# Patient Record
Sex: FEMALE | Race: WHITE | Employment: FULL TIME | URBAN - METROPOLITAN AREA
[De-identification: names, ages, dates, MRNs, and addresses within clinical notes are randomized per-mention and may not be internally consistent; named-entity substitution may affect disease eponyms.]

---

## 2017-12-14 ENCOUNTER — OFFICE VISIT (OUTPATIENT)
Dept: CARDIOLOGY CLINIC | Age: 37
End: 2017-12-14

## 2017-12-14 VITALS
DIASTOLIC BLOOD PRESSURE: 82 MMHG | WEIGHT: 142.8 LBS | SYSTOLIC BLOOD PRESSURE: 122 MMHG | BODY MASS INDEX: 26.28 KG/M2 | HEIGHT: 62 IN | OXYGEN SATURATION: 99 % | HEART RATE: 80 BPM

## 2017-12-14 DIAGNOSIS — R00.2 PALPITATION: ICD-10-CM

## 2017-12-14 DIAGNOSIS — E78.00 HYPERCHOLESTEREMIA: ICD-10-CM

## 2017-12-14 DIAGNOSIS — I47.1 SVT (SUPRAVENTRICULAR TACHYCARDIA) (HCC): ICD-10-CM

## 2017-12-14 DIAGNOSIS — R07.89 OTHER CHEST PAIN: ICD-10-CM

## 2017-12-14 DIAGNOSIS — G90.A POTS (POSTURAL ORTHOSTATIC TACHYCARDIA SYNDROME): Primary | ICD-10-CM

## 2017-12-14 DIAGNOSIS — F41.9 ANXIETY: ICD-10-CM

## 2017-12-14 PROCEDURE — 1036F TOBACCO NON-USER: CPT | Performed by: INTERNAL MEDICINE

## 2017-12-14 PROCEDURE — G8484 FLU IMMUNIZE NO ADMIN: HCPCS | Performed by: INTERNAL MEDICINE

## 2017-12-14 PROCEDURE — 93000 ELECTROCARDIOGRAM COMPLETE: CPT | Performed by: INTERNAL MEDICINE

## 2017-12-14 PROCEDURE — G8419 CALC BMI OUT NRM PARAM NOF/U: HCPCS | Performed by: INTERNAL MEDICINE

## 2017-12-14 PROCEDURE — 99204 OFFICE O/P NEW MOD 45 MIN: CPT | Performed by: INTERNAL MEDICINE

## 2017-12-14 PROCEDURE — G8427 DOCREV CUR MEDS BY ELIG CLIN: HCPCS | Performed by: INTERNAL MEDICINE

## 2017-12-14 RX ORDER — LORAZEPAM 0.5 MG/1
0.5 TABLET ORAL PRN
COMMUNITY

## 2017-12-14 RX ORDER — BIOTIN 10 MG
TABLET ORAL DAILY
COMMUNITY
End: 2020-08-06 | Stop reason: SDUPTHER

## 2017-12-14 RX ORDER — LANOLIN ALCOHOL/MO/W.PET/CERES
CREAM (GRAM) TOPICAL DAILY
COMMUNITY
End: 2020-08-06

## 2017-12-14 RX ORDER — ALBUTEROL SULFATE 90 UG/1
AEROSOL, METERED RESPIRATORY (INHALATION) PRN
COMMUNITY

## 2017-12-14 NOTE — COMMUNICATION BODY
[address]   Electrophysiology   Date: 12/14/2017  I had the privilege of visiting Lashonda Crocker in the office. CC: Maintenance evaluation for POTS/tachycardia. HPI: Lashonda Crocker is a 30 y.o. is here for ongoing follow up of POTS. She is currently on metoprolol which has helped reduce the number of palpitations episodes. She has occasional dizziness when she is under stressful situations. She denies chest pain, light-headedness, ALVES. Prior to this visit, she was seen by [facility] physician for symptoms of \"feeling pounding\" of her heart with a squeezing sensation; this occurred several times a day for 1 week but started after she began drinking chocolate protein shakes. After a week, she stopped drinking them, and her symptoms resolved. She was not feeling particularly stressed at the time. She usually tries to avoid caffeine in general. She did not take the salt tablets as recommended at earlier visit, but she is trying to add more salt to foods. She stays active, does not smoke. Interval History:   Past note. \"7/5/17 Eli Marc is a 31 y.o. female, who presents today for a maintenance evaluation for POTS/tachycardia. Today she reports doing well at this time. She denies feeling any difference with increased salt and water consumption. She is still able to feel her heart beating in her chest and head, and occasionally in her fingertips. She denies regularly taking calcium supplements. Her vitamin D was last check in the fall. Also notes her whole face feels hot and her head is occasionally warm. Past Note: \"5/10/2017 Eli Marc is a 31 y.o. female, who presents today for an initial evaluation for POTS/tachycardia. She has a PMHx significant for palpitations and tachyarrhythmias. Today she reports that growing up as a child, she noticed a fast HR, could feel it beating in back of her head. Never told her parents.  Once she was older, would notice her HR would be in the 90's upon having breast.    She denies ever being diagnosed with HTN. Her cholesterol has been elevated, with her total cholesterol up to 235. She has a family history of CAD in her grandparents. Histories:   She has no past medical history on file. no Hx    She has no past surgical history on file. No Hx    Her family history includes Heart disease in her maternal grandfather and paternal grandmother; Hyperlipidemia in her father and mother. No Hx    She reports that she has never smoked. She does not have any smokeless tobacco history on file. She reports that she drinks alcohol. Past Medical History:   Diagnosis Date    Anxiety     Asthma     Hyperlipidemia     Irritable bowel syndrome       Past Surgical History:   Procedure Laterality Date    COLONOSCOPY      UPPER GASTROINTESTINAL ENDOSCOPY       Allergies:  No Known Allergies    Social History:  Reviewed. reports that she has never smoked. She has never used smokeless tobacco. She reports that she drinks alcohol. She reports that she does not use drugs. Never smoked    Family History:  Reviewed. family history includes Anemia in her paternal grandfather and paternal uncle; Arrhythmia in her mother; Colon Cancer in her paternal grandmother; Diabetes in her maternal cousin, maternal grandfather, maternal grandmother, and paternal aunt; Heart Attack in her paternal grandmother; Heart Disease in her maternal grandfather and paternal grandmother; High Blood Pressure in her father, maternal grandfather, mother, and paternal grandmother; High Cholesterol in her father, maternal grandmother, mother, and paternal grandmother; Mental Retardation in her paternal grandmother; Vanice Sleeper / [place] in her mother; Other in her paternal grandmother; Stroke in her maternal grandfather. Denies family history of sudden cardiac death, arrhythmia, premature CAD    Review of System:  All other systems reviewed and are negative except for that noted above.  Pertinent negatives are:     · General: negative for fever, chills   · Ophthalmic ROS: negative for - eye pain or loss of vision  · ENT ROS: negative for - headaches, sore throat   · Respiratory: negative for - cough, sputum  · Cardiovascular: Reviewed in HPI  · Gastrointestinal: negative for - abdominal pain, diarrhea, N/V  · Hematology: negative for - bleeding, blood clots, bruising or jaundice  · Genito-Urinary:  negative for - Dysuria or incontinence  · Musculoskeletal: negative for - Joint swelling, muscle pain  · Neurological: negative for - confusion, dizziness, headaches   · Psychiatric: No anxiety, no depression. · Dermatological: negative for - rash    Physical Examination:  Vitals:    12/14/17 1022   BP: 122/82   Pulse: 80   SpO2: 99%      · Constitutional: Oriented. No distress. · Head: Normocephalic and atraumatic. · Mouth/Throat: Oropharynx is clear and moist.   · Eyes: Conjunctivae normal. EOM are normal.   · Neck: Neck supple. No rigidity. No JVD present. · Cardiovascular: Normal rate, regular rhythm, S1&S2. · Pulmonary/Chest: Bilateral respiratory sounds. No wheezes, No rhonchi. · Abdominal: Soft. Bowel sounds present. No distension, No tenderness. · Musculoskeletal: No tenderness. No edema    · Lymphadenopathy: Has no cervical adenopathy. · Neurological: Alert and oriented. Cranial nerve appears intact, No Gross deficit   · Skin: Skin is warm and dry. No rash noted. · Psychiatric: Has a normal behavior     Labs, diagnostic and imaging results reviewed.      ECG today 12/14/17: NSR 82    Medication:  Current Outpatient Prescriptions   Medication Sig Dispense Refill    metoprolol tartrate (LOPRESSOR) 25 MG tablet Take 25 mg by mouth 2 times daily      DOCOSAHEXAENOIC ACID PO Take by mouth daily      albuterol sulfate  (90 Base) MCG/ACT inhaler Inhale into the lungs as needed      Biotin 10 MG tablet Take by mouth daily      loratadine-pseudoephedrine (CLARITIN-D 12HR) 5-120 MG per extended release tablet Take 1 tablet by mouth as needed      LORazepam (ATIVAN) 0.5 MG tablet Take 0.5 mg by mouth as needed .  calcium citrate-vitamin D (CITRACAL+D) 315-200 MG-UNIT per tablet Take by mouth daily       No current facility-administered medications for this visit. Assessment and plan:     1. Palpitation/POTS  Stable at this time. Has brief occ. flutters. BB reduced bouts of palpitations. - EKG 12 Lead showed NSR with CVR of 82. She has increase in HR with activity on Fitbit that althoufg seems higher than normal, as long as she tolerates, nothing needs to be done    2. chest pain: none recently and stable. 3. Hypercholesterolemia: last . advsed dietary modification  She is doing well. 4. Anxiety: on lorazepam  Recommend repeat monitoring if her palpitations return that last longer than a 24 hr period. No med changes. Stay well hydrated with 5-8 glasses per day. Continue to add salt to foods. Return in 6 months for routine follow up. I independently reviewed 6/13/17 stress test, 5/9/17 holter, 6/13/17 MCOT. Thank you for allowing me to participate in the care of Cassandra Chowdhury. Further evaluation will be based upon the patient's clinical course and testing results. All questions and concerns were addressed to the patient/family. Alternatives to my treatment were discussed. I have discussed the above stated plan and the patient verbalized understanding and agreed with the plan. NOTE: This report was transcribed using voice recognition software. Every effort was made to ensure accuracy, however, inadvertent computerized transcription errors may be present.        Rao Simon MD, [facility] [address] [facility]   Office: [phone]

## 2017-12-14 NOTE — PROGRESS NOTES
[address]   Electrophysiology   Date: 12/14/2017  I had the privilege of visiting Dania Marinelli in the office. CC: Maintenance evaluation for POTS/tachycardia. HPI: Dania Marinelli is a 30 y.o. is here for ongoing follow up of POTS. She is currently on metoprolol which has helped reduce the number of palpitations episodes. She has occasional dizziness when she is under stressful situations. She denies chest pain, light-headedness, ALVES. Prior to this visit, she was seen by [facility] physician for symptoms of \"feeling pounding\" of her heart with a squeezing sensation; this occurred several times a day for 1 week but started after she began drinking chocolate protein shakes. After a week, she stopped drinking them, and her symptoms resolved. She was not feeling particularly stressed at the time. She usually tries to avoid caffeine in general. She did not take the salt tablets as recommended at earlier visit, but she is trying to add more salt to foods. She stays active, does not smoke. Interval History:   Past note. \"7/5/17 Cheng Rascon is a 31 y.o. female, who presents today for a maintenance evaluation for POTS/tachycardia. Today she reports doing well at this time. She denies feeling any difference with increased salt and water consumption. She is still able to feel her heart beating in her chest and head, and occasionally in her fingertips. She denies regularly taking calcium supplements. Her vitamin D was last check in the fall. Also notes her whole face feels hot and her head is occasionally warm. Past Note: \"5/10/2017 Cheng Rascon is a 31 y.o. female, who presents today for an initial evaluation for POTS/tachycardia. She has a PMHx significant for palpitations and tachyarrhythmias. Today she reports that growing up as a child, she noticed a fast HR, could feel it beating in back of her head. Never told her parents.  Once she was older, would notice her HR would be in the 90's upon having per extended release tablet Take 1 tablet by mouth as needed      LORazepam (ATIVAN) 0.5 MG tablet Take 0.5 mg by mouth as needed .  calcium citrate-vitamin D (CITRACAL+D) 315-200 MG-UNIT per tablet Take by mouth daily       No current facility-administered medications for this visit. Assessment and plan:     1. Palpitation/POTS  Stable at this time. Has brief occ. flutters. BB reduced bouts of palpitations. - EKG 12 Lead showed NSR with CVR of 82. She has increase in HR with activity on Fitbit that althoufg seems higher than normal, as long as she tolerates, nothing needs to be done    2. chest pain: none recently and stable. 3. Hypercholesterolemia: last . advsed dietary modification  She is doing well. 4. Anxiety: on lorazepam  Recommend repeat monitoring if her palpitations return that last longer than a 24 hr period. No med changes. Stay well hydrated with 5-8 glasses per day. Continue to add salt to foods. Return in 6 months for routine follow up. I independently reviewed 6/13/17 stress test, 5/9/17 holter, 6/13/17 MCOT. Thank you for allowing me to participate in the care of Antonio Sahu. Further evaluation will be based upon the patient's clinical course and testing results. All questions and concerns were addressed to the patient/family. Alternatives to my treatment were discussed. I have discussed the above stated plan and the patient verbalized understanding and agreed with the plan. NOTE: This report was transcribed using voice recognition software. Every effort was made to ensure accuracy, however, inadvertent computerized transcription errors may be present.        Evaristo Lujan MD, Nelsy Canseco [address] [place] [address]   Office: [phone]

## 2017-12-14 NOTE — LETTER
[address] [address] [address] [id number]  Phone: [phone]  Fax: [phone]    Gloria Herrmann MD        December 14, 2017     [address] [place] [address]    Patient: Starla Gamboa  MR Number: [id number]  YOB: 1980  Date of Visit: 12/14/2017    Dear Dr. Bevelrey Mckeon:    Below are the relevant portions of my assessment and plan of care. [address]   Electrophysiology   Date: 12/14/2017  I had the privilege of visiting Starla Gamboa in the office. CC: Maintenance evaluation for POTS/tachycardia. HPI: Starla Gamboa is a 30 y.o. is here for ongoing follow up of POTS. She is currently on metoprolol which has helped reduce the number of palpitations episodes. She has occasional dizziness when she is under stressful situations. She denies chest pain, light-headedness, ALVES. Prior to this visit, she was seen by [facility] physician for symptoms of \"feeling pounding\" of her heart with a squeezing sensation; this occurred several times a day for 1 week but started after she began drinking chocolate protein shakes. After a week, she stopped drinking them, and her symptoms resolved. She was not feeling particularly stressed at the time. She usually tries to avoid caffeine in general. She did not take the salt tablets as recommended at earlier visit, but she is trying to add more salt to foods. She stays active, does not smoke. Interval History:   Past note. \"7/5/17 Mick Dinero is a 31 y.o. female, who presents today for a maintenance evaluation for POTS/tachycardia. Today she reports doing well at this time. She denies feeling any difference with increased salt and water consumption. She is still able to feel her heart beating in her chest and head, and occasionally in her fingertips. She denies regularly taking calcium supplements. Her vitamin D was last check in the fall. Also notes her whole face feels hot and her head is occasionally warm. Past Note: \"5/10/2017 Evan Petersen is a 31 y.o. female, who presents today for an initial evaluation for POTS/tachycardia. She has a PMHx significant for palpitations and tachyarrhythmias. Today she reports that growing up as a child, she noticed a fast HR, could feel it beating in back of her head. Never told her parents. Once she was older, would notice her HR would be in the 90's upon having annual check ups. She started seeing a cardiologist 3 years ago as she wanted to started a family. She went to a [employer], and notes having to leave has her heart was racing, was able to feel it in her head, arms and chest. Notes it was warm out that day. Per pt, notes her HR is elevated if she doesn't get enough sleep. Her HR is often elevated upon doing minor things such as getting up to go to the bathroom, with some associated SOB. Her cardiologist diagnosed with her inapporiate sinus tachycardia, and possible POTS. On her honeymoon, was at a [employer], had a beer, and notes feeling horrible due her heart beating rapidly. She was also dizzy with blurred vision. She was unable to walk as she felt she was going to die. Had to be golf-carted out. Also notes it was hot that day and she was under the weather. Per pt, if she is sitting under a blanket, she is able to feel her HR racing. Will often feel cold and \"clamy\" following her episodes. Her mother is currently on Atenolol for issues with her heart rhythm. Was told she has a \"perky\" heart. Notes she is currently on Lozazepam for anxiety. Per pt, notes that last week, she went to her dance class and had a beer afterwards, and upon checking her FitBit after class, noticed her HR was in the 110's with some associated chest pain. Notes that has never occurred before.  Her HR remained elevated for several hours, which drove her to go the [place] [facility] ED. Upon being worked up, everything was found to be normal.     She continues to feel intermittent chest pain on the left side. Recently, she wore a 24 hour Holter monitor. Notes that she had some chest pain while on the monitor, however didn't experience any rapid HR. She has occasionally felt a squeeze, and minor palpitation. Her chest is tender to palpation. She notes being very sensitive. Her 8lb dog often causes her pain. 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    She denies ever being diagnosed with HTN. Her cholesterol has been elevated, with her total cholesterol up to 235. She has a family history of CAD in her grandparents. Histories:   She has no past medical history on file. no Hx    She has no past surgical history on file. No Hx    Her family history includes Heart disease in her maternal grandfather and paternal grandmother; Hyperlipidemia in her father and mother. No Hx    She reports that she has never smoked. She does not have any smokeless tobacco history on file. She reports that she drinks alcohol. Past Medical History:   Diagnosis Date    Anxiety     Asthma     Hyperlipidemia     Irritable bowel syndrome       Past Surgical History:   Procedure Laterality Date    COLONOSCOPY      UPPER GASTROINTESTINAL ENDOSCOPY       Allergies:  No Known Allergies    Social History:  Reviewed. reports that she has never smoked. She has never used smokeless tobacco. She reports that she drinks alcohol. She reports that she does not use drugs. Never smoked    Family History:  Reviewed. family history includes Anemia in her paternal grandfather and paternal uncle; Arrhythmia in her mother; Colon Cancer in her paternal grandmother; Diabetes in her maternal cousin, maternal grandfather, maternal grandmother, and paternal aunt;  Heart Attack in her paternal grandmother; Heart Disease in her maternal grandfather and paternal · Psychiatric: Has a normal behavior     Labs, diagnostic and imaging results reviewed. ECG today 12/14/17: NSR 82    Medication:  Current Outpatient Prescriptions   Medication Sig Dispense Refill    metoprolol tartrate (LOPRESSOR) 25 MG tablet Take 25 mg by mouth 2 times daily      DOCOSAHEXAENOIC ACID PO Take by mouth daily      albuterol sulfate  (90 Base) MCG/ACT inhaler Inhale into the lungs as needed      Biotin 10 MG tablet Take by mouth daily      loratadine-pseudoephedrine (CLARITIN-D 12HR) 5-120 MG per extended release tablet Take 1 tablet by mouth as needed      LORazepam (ATIVAN) 0.5 MG tablet Take 0.5 mg by mouth as needed .  calcium citrate-vitamin D (CITRACAL+D) 315-200 MG-UNIT per tablet Take by mouth daily       No current facility-administered medications for this visit. Assessment and plan:     1. Palpitation/POTS  Stable at this time. Has brief occ. flutters. BB reduced bouts of palpitations. - EKG 12 Lead showed NSR with CVR of 82. She has increase in HR with activity on Fitbit that althoufg seems higher than normal, as long as she tolerates, nothing needs to be done    2. chest pain: none recently and stable. 3. Hypercholesterolemia: last . advsed dietary modification  She is doing well. 4. Anxiety: on lorazepam  Recommend repeat monitoring if her palpitations return that last longer than a 24 hr period. No med changes. Stay well hydrated with 5-8 glasses per day. Continue to add salt to foods. Return in 6 months for routine follow up. I independently reviewed 6/13/17 stress test, 5/9/17 holter, 6/13/17 MCOT. Thank you for allowing me to participate in the care of Antonio Sahu. Further evaluation will be based upon the patient's clinical course and testing results. All questions and concerns were addressed to the patient/family. Alternatives to my treatment were discussed.  I have discussed the above

## 2018-06-07 ENCOUNTER — OFFICE VISIT (OUTPATIENT)
Dept: CARDIOLOGY CLINIC | Age: 38
End: 2018-06-07

## 2018-06-07 VITALS
OXYGEN SATURATION: 99 % | HEIGHT: 62 IN | SYSTOLIC BLOOD PRESSURE: 118 MMHG | BODY MASS INDEX: 24 KG/M2 | WEIGHT: 130.4 LBS | HEART RATE: 102 BPM | DIASTOLIC BLOOD PRESSURE: 78 MMHG

## 2018-06-07 DIAGNOSIS — F41.9 ANXIETY: ICD-10-CM

## 2018-06-07 DIAGNOSIS — G90.A POTS (POSTURAL ORTHOSTATIC TACHYCARDIA SYNDROME): Primary | ICD-10-CM

## 2018-06-07 DIAGNOSIS — R00.2 PALPITATIONS: ICD-10-CM

## 2018-06-07 DIAGNOSIS — R42 DIZZINESS: ICD-10-CM

## 2018-06-07 DIAGNOSIS — E78.5 HYPERLIPIDEMIA, UNSPECIFIED HYPERLIPIDEMIA TYPE: ICD-10-CM

## 2018-06-07 PROCEDURE — G8420 CALC BMI NORM PARAMETERS: HCPCS | Performed by: INTERNAL MEDICINE

## 2018-06-07 PROCEDURE — 99214 OFFICE O/P EST MOD 30 MIN: CPT | Performed by: INTERNAL MEDICINE

## 2018-06-07 PROCEDURE — 1036F TOBACCO NON-USER: CPT | Performed by: INTERNAL MEDICINE

## 2018-06-07 PROCEDURE — G8427 DOCREV CUR MEDS BY ELIG CLIN: HCPCS | Performed by: INTERNAL MEDICINE

## 2018-06-07 PROCEDURE — 93000 ELECTROCARDIOGRAM COMPLETE: CPT | Performed by: INTERNAL MEDICINE

## 2018-07-31 ENCOUNTER — TELEPHONE (OUTPATIENT)
Dept: CARDIOLOGY CLINIC | Age: 38
End: 2018-07-31

## 2018-08-06 ENCOUNTER — TELEPHONE (OUTPATIENT)
Dept: CARDIOLOGY CLINIC | Age: 38
End: 2018-08-06

## 2018-12-12 NOTE — PROGRESS NOTES
rhonchi. · Abdominal: Soft. Bowel sounds present. No distension, No tenderness. · Musculoskeletal: No tenderness. No edema    · Lymphadenopathy: Has no cervical adenopathy. · Neurological: Alert and oriented. Cranial nerve appears intact, No Gross deficit   · Skin: Skin is warm and dry. No rash noted. · Psychiatric: Has a normal behavior     Labs, diagnostic and imaging results reviewed. No results found for: TSHREFLEX, TSH, CREATININE, AMIOD, AST, ALT      ECG 12/13/2018:  NSR 81bpm  QTcH 398    Medication:  Current Outpatient Prescriptions   Medication Sig Dispense Refill    metoprolol tartrate (LOPRESSOR) 25 MG tablet Take 1 tablet by mouth 2 times daily 180 tablet 3    albuterol sulfate  (90 Base) MCG/ACT inhaler Inhale into the lungs as needed      Biotin 10 MG tablet Take by mouth daily      loratadine-pseudoephedrine (CLARITIN-D 12HR) 5-120 MG per extended release tablet Take 1 tablet by mouth as needed      LORazepam (ATIVAN) 0.5 MG tablet Take 0.5 mg by mouth as needed .  DOCOSAHEXAENOIC ACID PO Take by mouth daily      calcium citrate-vitamin D (CITRACAL+D) 315-200 MG-UNIT per tablet Take by mouth daily       No current facility-administered medications for this visit. Assessment and plan:   1. Palpitation/POTS  Stable at this time. Has brief occ. flutters. BB reduced bouts of palpitations. Also had some spikeds of HR on fitbit with no activity. Will monitor    If possible , she will try to wean off beta-blocker as her pregnancy advances  She is still symptomatic without it    2. dizziness: more likely related to anxiety episodes   Increase salt intake   May consider mestinon but not while pregnant    3. Hypercholesterolemia: last  (3/2017). advsed dietary modification  She is doing well. 4. Anxiety: MBSR has helped    I independently reviewed 6/13/17 stress test, 5/9/17 holter, 6/13/17 MCOT.     Plan:  No changes to medications - unless otherwise directed by OBGYN  Compression stockings  Continue with stress/anxiety management  Follow up 6 months      Thank you for allowing me to participate in the care of [facility] . Further evaluation will be based upon the patient's clinical course and testing results. Scribe attestation: This note was scribed in the presence of Nicole Caraballo MD by Carlos Esparza RN      All questions and concerns were addressed to the patient/family. Alternatives to my treatment were discussed. I have discussed the above stated plan and the patient verbalized understanding and agreed with the plan. NOTE: This report was transcribed using voice recognition software. Every effort was made to ensure accuracy, however, inadvertent computerized transcription errors may be present.        Nicole Caraballo MD, [address]   Office: [phone]

## 2018-12-13 ENCOUNTER — OFFICE VISIT (OUTPATIENT)
Dept: CARDIOLOGY CLINIC | Age: 38
End: 2018-12-13
Payer: COMMERCIAL

## 2018-12-13 VITALS
SYSTOLIC BLOOD PRESSURE: 120 MMHG | HEIGHT: 62 IN | DIASTOLIC BLOOD PRESSURE: 83 MMHG | HEART RATE: 84 BPM | WEIGHT: 133.8 LBS | BODY MASS INDEX: 24.62 KG/M2

## 2018-12-13 DIAGNOSIS — R42 DIZZINESS: ICD-10-CM

## 2018-12-13 DIAGNOSIS — E78.2 MIXED HYPERLIPIDEMIA: ICD-10-CM

## 2018-12-13 DIAGNOSIS — F41.9 ANXIETY: ICD-10-CM

## 2018-12-13 DIAGNOSIS — G90.A POTS (POSTURAL ORTHOSTATIC TACHYCARDIA SYNDROME): Primary | ICD-10-CM

## 2018-12-13 PROCEDURE — 93000 ELECTROCARDIOGRAM COMPLETE: CPT | Performed by: INTERNAL MEDICINE

## 2018-12-13 PROCEDURE — 99214 OFFICE O/P EST MOD 30 MIN: CPT | Performed by: INTERNAL MEDICINE

## 2018-12-13 PROCEDURE — G8420 CALC BMI NORM PARAMETERS: HCPCS | Performed by: INTERNAL MEDICINE

## 2018-12-13 PROCEDURE — G8427 DOCREV CUR MEDS BY ELIG CLIN: HCPCS | Performed by: INTERNAL MEDICINE

## 2018-12-13 PROCEDURE — 1036F TOBACCO NON-USER: CPT | Performed by: INTERNAL MEDICINE

## 2018-12-13 PROCEDURE — G8484 FLU IMMUNIZE NO ADMIN: HCPCS | Performed by: INTERNAL MEDICINE

## 2019-04-02 ENCOUNTER — TELEPHONE (OUTPATIENT)
Dept: CARDIOLOGY CLINIC | Age: 39
End: 2019-04-02

## 2019-04-02 NOTE — TELEPHONE ENCOUNTER
Spoke to the pt and she states that her pain could be caused from her workout yesterday. She states she did a different than normal workout yesterday. She states that she did kick boxing and punching for about 10 minutes. She states she feels better today (she was walking her dog while on the phone with me). She just wants to put her mind at ease and is asking to come in for an EKG. I spoke to [facility] RN who spoke to [facility] regarding this situation. They are asking that she consult with her OB regarding further work up. Notified pt. She verbalized understanding and agreed.

## 2019-04-02 NOTE — TELEPHONE ENCOUNTER
Pt states she did exercise yesterday and a few hours later around 6-7 pm she began having chest pain on left side. No other symptoms, but chest pain continues today. Please call to advise.

## 2019-04-02 NOTE — TELEPHONE ENCOUNTER
Pt says she was doing kickboxing exercises last night. She is 5 months pregnant, she says the pain has eased up a little bit. Pain in her L shoulder area but mainly in her chest area. She is leaving for [place] this evening but she is wondering if she can come in for an ekg or what she should do? bp and hr are normal she says.

## 2019-04-02 NOTE — TELEPHONE ENCOUNTER
Patient has a history of chest pain which has been evaluated in the past and was said to be issues with fibrosis in the left breast. If she is truly having chest pain and radiating to left arm she needs to be seen in the ED we cannot triage her here.  Please call patient with recommendations

## 2019-06-12 NOTE — PROGRESS NOTES
[facility]   Electrophysiology Follow up  Date: 6/13/2019  I had the privilege of visiting Helder Louis in the office. Chief Complaint   Patient presents with    Palpitations    Tachycardia   anxiety    HPI: Helder Louis is a 41 y.o. female with a PMH of anxiety, asthma, HLD and POTS. Interval History: Today Clarisa presents for a 6 month follow up. She is concerned about starting glipizide for gestational diabetes she may take this medication without concerns. She had complaints of tachycardia after getting out of a hot shower. Pregnant , due in August    Past Note:    12/2017  She is currently on metoprolol which has helped reduce the number of palpitations episodes. She has occasional dizziness when she is under stressful situations. She denies chest pain, light-headedness, ALVES. Prior to this visit, she was seen by [facility] physician for symptoms of \"feeling pounding\" of her heart with a squeezing sensation; this occurred several times a day for 1 week but started after she began drinking chocolate protein shakes. After a week, she stopped drinking them, and her symptoms resolved. She was not feeling particularly stressed at the time. She usually tries to avoid caffeine in general. She did not take the salt tablets as recommended at earlier visit, but she is trying to add more salt to foods. She stays active, does not smoke. Past note. \"7/5/17 Akash Martinez is a 31 y.o. female, who presents today for a maintenance evaluation for POTS/tachycardia. Today she reports doing well at this time. She denies feeling any difference with increased salt and water consumption. She is still able to feel her heart beating in her chest and head, and occasionally in her fingertips. She denies regularly taking calcium supplements. Her vitamin D was last check in the fall. Also notes her whole face feels hot and her head is occasionally warm.      Past Note: \"5/10/2017 Akash Martinez is a 31 y.o. female, who presents today for an initial evaluation for POTS/tachycardia. She has a PMHx significant for palpitations and tachyarrhythmias. Today she reports that growing up as a child, she noticed a fast HR, could feel it beating in back of her head. Never told her parents. Once she was older, would notice her HR would be in the 90's upon having annual check ups. She started seeing a cardiologist 3 years ago as she wanted to started a family. She went to a [employer] game, and notes having to leave has her heart was racing, was able to feel it in her head, arms and chest. Notes it was warm out that day. Per pt, notes her HR is elevated if she doesn't get enough sleep. Her HR is often elevated upon doing minor things such as getting up to go to the bathroom, with some associated SOB. Her cardiologist diagnosed with her inapporiate sinus tachycardia, and possible POTS. On her honeymoon, was at a [employer], had a beer, and notes feeling horrible due her heart beating rapidly. She was also dizzy with blurred vision. She was unable to walk as she felt she was going to die. Had to be golf-carted out. Also notes it was hot that day and she was under the weather. Per pt, if she is sitting under a blanket, she is able to feel her HR racing. Will often feel cold and \"clamy\" following her episodes. Her mother is currently on Atenolol for issues with her heart rhythm. Was told she has a \"perky\" heart. Notes she is currently on Lozazepam for anxiety. Per pt, notes that last week, she went to her dance class and had a beer afterwards, and upon checking her FitBit after class, noticed her HR was in the 110's with some associated chest pain. Notes that has never occurred before. Her HR remained elevated for several hours, which drove her to go the [id number] [facility] ED. Upon being worked up, everything was found to be normal.     She continues to feel intermittent chest pain on the left side.  Recently, she wore a 24 hour Holter monitor. Notes that she had some chest pain while on the monitor, however didn't experience any rapid HR. She has occasionally felt a squeeze, and minor palpitation. Her chest is tender to palpation. She notes being very sensitive. Her 8lb dog often causes her pain. 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    She denies ever being diagnosed with HTN. Her cholesterol has been elevated, with her total cholesterol up to 235. Past Medical History:   Diagnosis Date    Anxiety     Asthma     Hyperlipidemia     Irritable bowel syndrome       Past Surgical History:   Procedure Laterality Date    COLONOSCOPY      UPPER GASTROINTESTINAL ENDOSCOPY       Allergies: Allergies   Allergen Reactions    Dicyclomine      Other reaction(s): Other (See Comments)  Difficulty swallowing food  Other reaction(s): Other (See Comments)  Difficulty swallowing food    Adhesive Tape Rash       Social History:  Reviewed. reports that she has never smoked. She has never used smokeless tobacco. She reports that she drinks alcohol. She reports that she does not use drugs. Never smoked    Family History:  Reviewed. family history includes Anemia in her paternal grandfather and paternal uncle; Arrhythmia in her mother; Colon Cancer in her paternal grandmother; Diabetes in her maternal cousin, maternal grandfather, maternal grandmother, and paternal aunt; Heart Attack in her paternal grandmother; Heart Disease in her maternal grandfather and paternal grandmother; High Blood Pressure in her father, maternal grandfather, mother, and paternal grandmother; High Cholesterol in her father, maternal grandmother, mother, and paternal grandmother; Mental Retardation in her paternal grandmother; [place] [facility] / [place] in her mother; Other in her paternal grandmother; Stroke in her maternal grandfather. Denies family history of sudden cardiac death, arrhythmia, premature CAD    Review of System:   All other systems reviewed and are negative except for that noted above. Pertinent negatives are:     · General: negative for fever, chills   · Ophthalmic ROS: negative for - eye pain or loss of vision  · ENT ROS: negative for - headaches, sore throat   · Respiratory: negative for - cough, sputum  · Cardiovascular: Reviewed in HPI  · Gastrointestinal: negative for - abdominal pain, diarrhea, N/V  · Hematology: negative for - bleeding, blood clots, bruising or jaundice  · Genito-Urinary:  negative for - Dysuria or incontinence  · Musculoskeletal: negative for - Joint swelling, muscle pain  · Neurological: negative for - confusion, dizziness, headaches   · Psychiatric: No anxiety, no depression. · Dermatological: negative for - rash    Physical Examination:  Vitals:    06/13/19 0818   BP: 121/80   Pulse: 99      · Constitutional: Oriented. No distress. · Head: Normocephalic and atraumatic. · Mouth/Throat: Oropharynx is clear and moist.   · Eyes: Conjunctivae normal. EOM are normal.   · Neck: Neck supple. No rigidity. No JVD present. · Cardiovascular: Normal rate, regular rhythm, S1&S2. · Pulmonary/Chest: Bilateral respiratory sounds. No wheezes, No rhonchi. · Abdominal: Soft. Bowel sounds present. No distension, No tenderness. · Musculoskeletal: No tenderness. No edema    · Lymphadenopathy: Has no cervical adenopathy. · Neurological: Alert and oriented. Cranial nerve appears intact, No Gross deficit   · Skin: Skin is warm and dry. No rash noted. · Psychiatric: Has a normal behavior     Labs, diagnostic and imaging results reviewed.    No results found for: TSHREFLEX, TSH, CREATININE, AMIOD, AST, ALT      ECG 6/13/2019:   Sinus   QTc 409      Medication:  Current Outpatient Medications   Medication Sig Dispense Refill    metoprolol tartrate (LOPRESSOR) 25 MG tablet Take 1 tablet by mouth 2 times daily 180 tablet 3    albuterol sulfate  (90 Base) MCG/ACT inhaler Inhale into the lungs as needed  Biotin 10 MG tablet Take by mouth daily      loratadine-pseudoephedrine (CLARITIN-D 12HR) 5-120 MG per extended release tablet Take 1 tablet by mouth as needed      LORazepam (ATIVAN) 0.5 MG tablet Take 0.5 mg by mouth as needed .  DOCOSAHEXAENOIC ACID PO Take by mouth daily      calcium citrate-vitamin D (CITRACAL+D) 315-200 MG-UNIT per tablet Take by mouth daily       No current facility-administered medications for this visit. Assessment and plan:   1. Palpitation/POTS  Can cut metoprolol to 12.5 mg BID and then discontinue after delivery  Wears a Fitbit but is interested in purchasing a better home monitoring system such as [employer] or [employer] at this time. Has brief occ. flutters. BB reduced bouts of palpitations. Also had some spikeds of HR on fitbit with no activity. Will monitor  If possible , she will try to wean off beta-blocker as her pregnancy advances  She is still symptomatic without it  May consider verapamil if symptomatic off beta-blocker  (she had reduced sex drive on toprol)  2. dizziness:   more likely related to anxiety episodes  Increase salt intake  May consider mestinon but not while pregnant  Going with MBSR    3. Hypercholesterolemia:   advised dietary modification  She is doing well. 4. Anxiety:   MBSR has helped    I independently reviewed 6/13/17 stress test, 5/9/17 holter, 6/13/17 MCOT. Plan:  Continue current medications   8 month f/u  Make sure she is hydrated during delivery      Thank you for allowing me to participate in the care of González Plasencia. Further evaluation will be based upon the patient's clinical course and testing results. NOTE: This report was transcribed using voice recognition software. Every effort was made to ensure accuracy, however, inadvertent computerized transcription errors may be present. Dania Sepulveda MD, Keyla Ryder [address] [facility]   Office: [phone]    Scribe attestation:  This note was scribed in the presence of Fernando Del Rosario MD by Miles Lewis RN    I, Dr. Fernando Del Rosario personally performed the services described in this documentation as scribed by RN in my presence, and it is both accurate and complete.

## 2019-06-13 ENCOUNTER — OFFICE VISIT (OUTPATIENT)
Dept: CARDIOLOGY CLINIC | Age: 39
End: 2019-06-13
Payer: COMMERCIAL

## 2019-06-13 VITALS
SYSTOLIC BLOOD PRESSURE: 121 MMHG | DIASTOLIC BLOOD PRESSURE: 80 MMHG | HEART RATE: 99 BPM | HEIGHT: 62 IN | WEIGHT: 163 LBS | BODY MASS INDEX: 30 KG/M2

## 2019-06-13 DIAGNOSIS — R42 DIZZINESS: ICD-10-CM

## 2019-06-13 DIAGNOSIS — F41.9 ANXIETY: ICD-10-CM

## 2019-06-13 DIAGNOSIS — E78.2 MIXED HYPERLIPIDEMIA: ICD-10-CM

## 2019-06-13 DIAGNOSIS — G90.A POTS (POSTURAL ORTHOSTATIC TACHYCARDIA SYNDROME): Primary | ICD-10-CM

## 2019-06-13 DIAGNOSIS — R00.2 PALPITATION: ICD-10-CM

## 2019-06-13 PROCEDURE — 1036F TOBACCO NON-USER: CPT | Performed by: INTERNAL MEDICINE

## 2019-06-13 PROCEDURE — G8427 DOCREV CUR MEDS BY ELIG CLIN: HCPCS | Performed by: INTERNAL MEDICINE

## 2019-06-13 PROCEDURE — 93000 ELECTROCARDIOGRAM COMPLETE: CPT | Performed by: INTERNAL MEDICINE

## 2019-06-13 PROCEDURE — 99214 OFFICE O/P EST MOD 30 MIN: CPT | Performed by: INTERNAL MEDICINE

## 2019-06-13 PROCEDURE — G8419 CALC BMI OUT NRM PARAM NOF/U: HCPCS | Performed by: INTERNAL MEDICINE

## 2019-10-30 ENCOUNTER — TELEPHONE (OUTPATIENT)
Dept: CARDIOLOGY CLINIC | Age: 39
End: 2019-10-30

## 2020-01-29 NOTE — PROGRESS NOTES
[address]   Electrophysiology Follow up  Date: 1/30/2020  I had the privilege of visiting Maryam Choi in the office. Chief Complaint   Patient presents with    Palpitations     8 month f/u. Discuss medications. Pt would like to stop Metoprolol and start Verapamil.  Shortness of Breath     SOB with exertion.  Chest Pain     C/o noticing occasional chest pain. Pt is concerned with her strong fam HX of CAD. anxiety    HPI: Maryam Choi is a 42 y.o. is here for ongoing follow up of POTS. She is currently pregnant and has followed up with her PCP due to diarrhea and nausea. She does have a history of IBS, labs were drawn. She experienced palpitations at this time, but felt it was due to dehydration for frequent diarrhea. She has been to Dr. Riri Romo stress management class to learn techniques to help manage her anxiety. Clarisa is now post partum and planning to try weaning her Metoprolol. Interval History:  She tried cutting back on Metoprolol and felt more frequent palpitations. She was down to a quarter of a tablet and then resumed. She experienced a reduced sex drive on Metoprolol. She is also dealing with anxiety. She voices concerns of CAD, due to strong family history. She wants to improve her lifestyle to a heart healthy living. She is deconditioned and gets winded with stair climbing. Past Medical History:   Diagnosis Date    Anxiety     Asthma     Hyperlipidemia     Irritable bowel syndrome       Past Surgical History:   Procedure Laterality Date    COLONOSCOPY      UPPER GASTROINTESTINAL ENDOSCOPY       Allergies: Allergies   Allergen Reactions    Dicyclomine      Other reaction(s): Other (See Comments)  Difficulty swallowing food  Other reaction(s): Other (See Comments)  Difficulty swallowing food    Adhesive Tape Rash       Social History:  Reviewed. reports that she has never smoked.  She has never used smokeless tobacco. She reports current alcohol use. She reports that she does not use drugs. Never smoked    Family History:  Reviewed. family history includes Anemia in her paternal grandfather and paternal uncle; Arrhythmia in her mother; Colon Cancer in her paternal grandmother; Diabetes in her maternal cousin, maternal grandfather, maternal grandmother, and paternal aunt; Heart Attack in her paternal grandmother; Heart Disease in her maternal grandfather and paternal grandmother; High Blood Pressure in her father, maternal grandfather, mother, and paternal grandmother; High Cholesterol in her father, maternal grandmother, mother, and paternal grandmother; Mental Retardation in her paternal grandmother; [place] in her mother; Other in her paternal grandmother; Stroke in her maternal grandfather. Denies family history of sudden cardiac death, arrhythmia, premature CAD    Review of System:  All other systems reviewed and are negative except for that noted above. Pertinent negatives are:     · General: negative for fever, chills   · Ophthalmic ROS: negative for - eye pain or loss of vision  · ENT ROS: negative for - headaches, sore throat   · Respiratory: negative for - cough, sputum  · Cardiovascular: Reviewed in HPI  · Gastrointestinal: negative for - abdominal pain, diarrhea, N/V  · Hematology: negative for - bleeding, blood clots, bruising or jaundice  · Genito-Urinary:  negative for - Dysuria or incontinence  · Musculoskeletal: negative for - Joint swelling, muscle pain  · Neurological: negative for - confusion, dizziness, headaches   · Psychiatric: No anxiety, no depression. · Dermatological: negative for - rash    Physical Examination:  Vitals:    01/30/20 1038   BP: 115/80   Pulse: 64      · Constitutional: Oriented. No distress. · Head: Normocephalic and atraumatic. · Mouth/Throat: Oropharynx is clear and moist.   · Eyes: Conjunctivae normal. EOM are normal.   · Neck: Neck supple. No rigidity. No JVD present. RN in my presence, and it is both accurate and complete.

## 2020-01-30 ENCOUNTER — OFFICE VISIT (OUTPATIENT)
Dept: CARDIOLOGY CLINIC | Age: 40
End: 2020-01-30
Payer: COMMERCIAL

## 2020-01-30 VITALS
DIASTOLIC BLOOD PRESSURE: 80 MMHG | SYSTOLIC BLOOD PRESSURE: 115 MMHG | HEART RATE: 64 BPM | BODY MASS INDEX: 26.5 KG/M2 | WEIGHT: 144 LBS | HEIGHT: 62 IN

## 2020-01-30 PROCEDURE — 99214 OFFICE O/P EST MOD 30 MIN: CPT | Performed by: INTERNAL MEDICINE

## 2020-01-30 PROCEDURE — G8427 DOCREV CUR MEDS BY ELIG CLIN: HCPCS | Performed by: INTERNAL MEDICINE

## 2020-01-30 PROCEDURE — 93000 ELECTROCARDIOGRAM COMPLETE: CPT | Performed by: INTERNAL MEDICINE

## 2020-01-30 PROCEDURE — G8484 FLU IMMUNIZE NO ADMIN: HCPCS | Performed by: INTERNAL MEDICINE

## 2020-01-30 PROCEDURE — 1036F TOBACCO NON-USER: CPT | Performed by: INTERNAL MEDICINE

## 2020-01-30 PROCEDURE — G8419 CALC BMI OUT NRM PARAM NOF/U: HCPCS | Performed by: INTERNAL MEDICINE

## 2020-02-11 ENCOUNTER — HOSPITAL ENCOUNTER (OUTPATIENT)
Dept: CARDIOLOGY | Age: 40
Discharge: HOME | End: 2020-02-11
Payer: COMMERCIAL

## 2020-02-11 LAB
LV EF: 55 %
LVEF MODALITY: NORMAL

## 2020-02-11 PROCEDURE — 93306 TTE W/DOPPLER COMPLETE: CPT

## 2020-02-19 ENCOUNTER — TELEPHONE (OUTPATIENT)
Dept: CARDIOLOGY CLINIC | Age: 40
End: 2020-02-19

## 2020-02-19 NOTE — TELEPHONE ENCOUNTER
Please review echo results done 2/11/2020. What results can we give the pt? Summary   Normal left ventricle size, wall thickness, and systolic function with a   visually estimated ejection fraction of 55%. No regional wall motion abnormalities are seen. Normal left ventricular diastolic filling pressure. No significant valvular disease.

## 2020-03-16 ENCOUNTER — TELEPHONE (OUTPATIENT)
Dept: CARDIOLOGY CLINIC | Age: 40
End: 2020-03-16

## 2020-03-16 NOTE — TELEPHONE ENCOUNTER
Called Clarisa back. And offered to give department of health phone numbers. She was concerned about dilan Covid 19 with her underlying POTS.  I explained that the department of [facility] my be better suited to answer her questions regarding the virus

## 2020-07-02 NOTE — TELEPHONE ENCOUNTER
Requested Prescriptions     Pending Prescriptions Disp Refills    metoprolol tartrate (LOPRESSOR) 25 MG tablet [Pharmacy Med Name: METOPROLOL TARTRATE 25 MG TAB] 180 tablet 3     Sig: TAKE ONE TABLET BY MOUTH TWICE A DAY                  Last Office Visit: 1/30/2020     Next Office Visit: 8/6/2020

## 2020-08-05 NOTE — PROGRESS NOTES
[facility]   Electrophysiology VV  Date: 8/6/2020  I had the privilege of visiting Gal Barcenas in the office. CC:anxiety    HPI: Gal Barcenas is a 42 y.o. is here for ongoing follow up of POTS. She is currently pregnant and has followed up with her PCP due to diarrhea and nausea. She does have a history of IBS, labs were drawn. She experienced palpitations at this time, but felt it was due to dehydration for frequent diarrhea. She has been to Dr. Vianey Nguyen stress management class to learn techniques to help manage her anxiety. Clarisa is now post partum and planning to try weaning her Metoprolol. Interval History:  . Clarisa presents to the office via virtual visit. She is doing well today. Metoprolol has worked well for her and she is nervous about switching to Verapamil. Patient denies lightheadedness, dizziness, chest pain, palpitations, orthopnea, edema, presyncope or syncope. Past Medical History:   Diagnosis Date    Anxiety     Asthma     Hyperlipidemia     Irritable bowel syndrome       Past Surgical History:   Procedure Laterality Date    COLONOSCOPY      UPPER GASTROINTESTINAL ENDOSCOPY       Allergies: Allergies   Allergen Reactions    Dicyclomine      Other reaction(s): Other (See Comments)  Difficulty swallowing food  Other reaction(s): Other (See Comments)  Difficulty swallowing food    Adhesive Tape Rash       Social History:  Reviewed. reports that she has never smoked. She has never used smokeless tobacco. She reports current alcohol use. She reports that she does not use drugs. Never smoked    Family History:  Reviewed. family history includes Anemia in her paternal grandfather and paternal uncle; Arrhythmia in her mother; Colon Cancer in her paternal grandmother; Diabetes in her maternal cousin, maternal grandfather, maternal grandmother, and paternal aunt;  Heart Attack in her paternal grandmother; Heart Disease in her maternal grandfather and paternal grandmother; High Blood Pressure in her father, maternal grandfather, mother, and paternal grandmother; High Cholesterol in her father, maternal grandmother, mother, and paternal grandmother; Mental Retardation in her paternal grandmother; [place] / [place] in her mother; Other in her paternal grandmother; Stroke in her maternal grandfather. Denies family history of sudden cardiac death, arrhythmia, premature CAD    Review of System:  All other systems reviewed and are negative except for that noted above. Pertinent negatives are:     · General: negative for fever, chills   · Ophthalmic ROS: negative for - eye pain or loss of vision  · ENT ROS: negative for - headaches, sore throat   · Respiratory: negative for - cough, sputum  · Cardiovascular: Reviewed in HPI  · Gastrointestinal: negative for - abdominal pain, diarrhea, N/V  · Hematology: negative for - bleeding, blood clots, bruising or jaundice  · Genito-Urinary:  negative for - Dysuria or incontinence  · Musculoskeletal: negative for - Joint swelling, muscle pain  · Neurological: negative for - confusion, dizziness, headaches   · Psychiatric: No anxiety, no depression. · Dermatological: negative for - rash    Physical Examination:  There were no vitals filed for this visit. · Constitutional: Oriented. No distress. · Head: Normocephalic    · Eyes: Conjunctivae normal   · Neurological: Alert and oriented. · Psychiatric: Has a normal behavior     Labs, diagnostic and imaging results reviewed. No results found for: TSHREFLEX, TSH, CREATININE, AMIOD, AST, ALT    ECG 8/6/2020:   None VV    Echo 2/11/2020   Summary   Normal left ventricle size, wall thickness, and systolic function with a   visually estimated ejection fraction of 55%. No regional wall motion abnormalities are seen. Normal left ventricular diastolic filling pressure. No significant valvular disease. GXT: 6/13/17  Interpretation:    Resting ECG: Normal sinus rhythm.   The exercise was stopped due to shortness of breath. Symptoms/comments: chest pain, 5/10 before exercise, subsided during exercise. No further chest pain. The patient was anxious. INTERPRETATION: NORMAL STRESS TEST    Event monitor:  6/13/17  Basic Rhythm:  sinus  Number of Events:  51  Events/Symptoms:  Pt reported episodes of \"chest pain or   pressure, flutter or skipped beats, rapid or fast heart beat,   dizziness, shortness of breath, tired or fatigued\"  CONCLUSION:  When symptomatic, rhythm was sinus at  bpm with one PAC and   one ventricular couplet. Medication:  Current Outpatient Medications   Medication Sig Dispense Refill    metoprolol tartrate (LOPRESSOR) 25 MG tablet TAKE ONE TABLET BY MOUTH TWICE A  tablet 3    albuterol sulfate  (90 Base) MCG/ACT inhaler Inhale into the lungs as needed      loratadine-pseudoephedrine (CLARITIN-D 12HR) 5-120 MG per extended release tablet Take 1 tablet by mouth as needed      LORazepam (ATIVAN) 0.5 MG tablet Take 0.5 mg by mouth as needed . No current facility-administered medications for this visit. Assessment and plan:   Palpitation/POTS   -Metoprolol has resolved her palpitations but she is concerned regarding decreased libido. We discussed changing her to another medication such as verapamil.   -We will slowly transition her to verapamil 120 mg and if it does not control her symptoms she can go back to metoprolol 25 mg BID   -Stable at this time. Has brief occ. flutters. -BB reduced bouts of palpitations.   -Also had some spikeds of HR on fitbit with no activity. Will monitor   -If possible , she will try to change to verapamil    Dizziness:Anxiety   -more likely related to anxiety episodes   -Increase salt intake   -May consider mestinon but not while pregnant   -improved   -MBSR has helped improve symptoms    Hypercholesterolemia:    -last  (3/2017).     -Advised dietary modification      dyspnea on exertion   -Echo was normal   -she does not have post partum(late ) cardiomyopathy           Plan:    -Add verapamil 120 mg and if it does not control her symptoms she can go back to metoprolol 25 mg BID  -She will call us if she decides to proceed   -1 year f/u niecy    Thank you for allowing me to participate in the care of Ryan Wagner. Further evaluation will be based upon the patient's clinical course and testing results. All questions and concerns were addressed to the patient/family. Alternatives to my treatment were discussed. I have discussed the above stated plan and the patient verbalized understanding and agreed with the plan. NOTE: This report was transcribed using voice recognition software. Every effort was made to ensure accuracy, however, inadvertent computerized transcription errors may be present. Edilberto Carter MD, [address]   Office: [phone]    Scribe attestation: This note was scribed in the presence of Edilberto Carter MD by Martir De Jesus RN    I, Dr. Edilberto Carter personally performed the services described in this documentation as scribed by RN in my presence, and it is both accurate and complete.            Ryan Wagner is a 42 y.o. female being evaluated by a Virtual Visit (video visit) encounter to address concerns as mentioned above. A caregiver was present when appropriate. Due to this being a TeleHealth encounter (During XGVYX-71 public health emergency), evaluation of the following organ systems was limited: Vitals/Constitutional/EENT/Resp/CV/GI//MS/Neuro/Skin/Heme-Lymph-Imm. Pursuant to the emergency declaration under the [facility], [address] [place] [address] authority and the [employer] Act, this Virtual Visit was conducted with patient's (and/or legal guardian's) consent, to reduce the patient's risk of exposure to COVID-19 and provide necessary medical care. The patient (and/or legal guardian) has also been advised to contact this office for worsening conditions or problems, and seek emergency medical treatment and/or call 911 if deemed necessary. Patient identification was verified at the start of the visit: Yes    Total time spent for this encounter: Not billed by time    Services were provided through a video synchronous discussion virtually to substitute for in-person clinic visit. Patient and provider were located at their individual homes. --Shai Guardado RN on 8/6/2020 at 9:55 AM    An electronic signature was used to authenticate this note.

## 2020-08-06 ENCOUNTER — VIRTUAL VISIT (OUTPATIENT)
Dept: CARDIOLOGY CLINIC | Age: 40
End: 2020-08-06
Payer: COMMERCIAL

## 2020-08-06 PROCEDURE — 99213 OFFICE O/P EST LOW 20 MIN: CPT | Performed by: INTERNAL MEDICINE

## 2020-08-06 PROCEDURE — G8419 CALC BMI OUT NRM PARAM NOF/U: HCPCS | Performed by: INTERNAL MEDICINE

## 2020-08-06 PROCEDURE — G8427 DOCREV CUR MEDS BY ELIG CLIN: HCPCS | Performed by: INTERNAL MEDICINE

## 2020-08-06 PROCEDURE — 1036F TOBACCO NON-USER: CPT | Performed by: INTERNAL MEDICINE

## 2021-02-11 ENCOUNTER — TELEPHONE (OUTPATIENT)
Dept: CARDIOLOGY CLINIC | Age: 41
End: 2021-02-11

## 2021-02-11 NOTE — TELEPHONE ENCOUNTER
Called pt about the message below and she is wanting to speak to [address] [facility] herself about some question that she had. She stated that she wanted to know if she could take an extra 1/2 of the medication when it starts to get bad and also when is she suppose to go to the ER.

## 2021-02-11 NOTE — TELEPHONE ENCOUNTER
All placed to marky. Advised her to stay very well hydrated prior to vaccination.  Spoke with NPS[facility] and advised that she may take extra 12.5 mg of metoprolol if heart rate remains above 120 bpm. She VU

## 2021-02-11 NOTE — TELEPHONE ENCOUNTER
She should continue her metoprolol which will help control some of the palpitations.  Also advise her to stay well hydrated

## 2021-02-11 NOTE — TELEPHONE ENCOUNTER
Pt calling she is getting her second vaccine on Monday and she says when she is sick she gets heart palpitations and racing heart, is there something she can do if or when this happens? She is trying to prepare incase she has a reaction.   pls call to advise thank you

## 2021-05-13 ENCOUNTER — NURSE ONLY (OUTPATIENT)
Dept: CARDIOLOGY CLINIC | Age: 41
End: 2021-05-13
Payer: COMMERCIAL

## 2021-05-13 ENCOUNTER — TELEPHONE (OUTPATIENT)
Dept: CARDIOLOGY CLINIC | Age: 41
End: 2021-05-13

## 2021-05-13 DIAGNOSIS — G90.A POTS (POSTURAL ORTHOSTATIC TACHYCARDIA SYNDROME): ICD-10-CM

## 2021-05-13 DIAGNOSIS — R00.0 TACHYCARDIA: Primary | ICD-10-CM

## 2021-05-13 DIAGNOSIS — R00.2 PALPITATIONS: ICD-10-CM

## 2021-05-13 DIAGNOSIS — Z87.898 HISTORY OF PALPITATIONS: ICD-10-CM

## 2021-05-13 NOTE — TELEPHONE ENCOUNTER
Patient states she is having these episodes 1-2 a week. She feels it when at rest.  Pain is not exacerbated by activity. No Sob, No pain, does not radiate, no diaphoresis. Is helped by taking ativan. Not feeling palpitations or racing heart rate. Advised to go the ER if symptoms become severe. She is coming in today for a 30 day monitor.  Appt reschedled for 06/17

## 2021-05-13 NOTE — TELEPHONE ENCOUNTER
Pt calling with complaints of chest pressure and heaviness, no pain, no shortness of breath, just an uncomfortable feeling in her chest. Pt was scheduled first avaliable 5/27 , she would like to be seen sooner if possible? pls advise thank you

## 2021-05-13 NOTE — TELEPHONE ENCOUNTER
I spoke with pt and she stated that she has having these symptoms for a few weeks. She states an uncomfortable feeling. Same symptoms when she has anxiety, but it has been waking her from sleep when she feels that she would not feel anxious. Pt was advised to go to the ER with worsening s/s.

## 2021-05-13 NOTE — TELEPHONE ENCOUNTER
If her symptoms are severe, then I agree that she needs to be evaluated in ER. If not, she may have a monitor placed to assess for arrhythmia.     Genesis Leo, APRN-CNP

## 2021-06-14 PROCEDURE — 93272 ECG/REVIEW INTERPRET ONLY: CPT | Performed by: INTERNAL MEDICINE

## 2021-06-16 NOTE — PROGRESS NOTES
[facility], [place]   Electrophysiology VV  Date: 6/17/2021  I had the privilege of visiting Darby Thakkar in the office. CC:anxiety    HPI: Darby Thakkar is a 47 y.o. is here for ongoing follow up of POTS. She is currently pregnant and has followed up with her PCP due to diarrhea and nausea. She does have a history of IBS, labs were drawn. She experienced palpitations at this time, but felt it was due to dehydration for frequent diarrhea. She has been to Dr. Bobbi Mcpherson stress management class to learn techniques to help manage her anxiety. Clarisa is now post partum and planning to try weaning her Metoprolol. MCOT 5/13/2021 revealed all sinus rhythm and symptoms with sinus rhythm     Interval History:  . Clarisa presents to the office in follow up. She completed and MCOT that revealed all sinus rhythm. Continues on metoprolol and feels as though it helps with her symptoms. Discussed trying a different beta blocker would help alleviate her side effects of decreased libido. She would like to continue the metoprolol for now. Concerned about family hx of heart disease and encouraged risk factor modification with exercise and a heart healthy diet. Past Medical History:   Diagnosis Date    Anxiety     Asthma     Hyperlipidemia     Irritable bowel syndrome       Past Surgical History:   Procedure Laterality Date    COLONOSCOPY      UPPER GASTROINTESTINAL ENDOSCOPY       Allergies: Allergies   Allergen Reactions    Dicyclomine      Other reaction(s): Other (See Comments)  Difficulty swallowing food  Other reaction(s): Other (See Comments)  Difficulty swallowing food    Adhesive Tape Rash       Social History:  Reviewed. reports that she has never smoked. She has never used smokeless tobacco. She reports current alcohol use. She reports that she does not use drugs. Never smoked    Family History:  Reviewed.  family history includes Anemia in her paternal grandfather and paternal uncle; Arrhythmia in her mother; Colon Cancer in her paternal grandmother; Diabetes in her maternal cousin, maternal grandfather, maternal grandmother, and paternal aunt; Heart Attack in her paternal grandmother; Heart Disease in her maternal grandfather and paternal grandmother; High Blood Pressure in her father, maternal grandfather, mother, and paternal grandmother; High Cholesterol in her father, maternal grandmother, mother, and paternal grandmother; Mental Retardation in her paternal grandmother; Hanna Barbour / Jim Dandy in her mother; Other in her paternal grandmother; Stroke in her maternal grandfather. Denies family history of sudden cardiac death, arrhythmia, premature CAD    Review of System:  All other systems reviewed and are negative except for that noted above. Pertinent negatives are:     · General: negative for fever, chills   · Ophthalmic ROS: negative for - eye pain or loss of vision  · ENT ROS: negative for - headaches, sore throat   · Respiratory: negative for - cough, sputum  · Cardiovascular: Reviewed in HPI  · Gastrointestinal: negative for - abdominal pain, diarrhea, N/V  · Hematology: negative for - bleeding, blood clots, bruising or jaundice  · Genito-Urinary:  negative for - Dysuria or incontinence  · Musculoskeletal: negative for - Joint swelling, muscle pain  · Neurological: negative for - confusion, dizziness, headaches   · Psychiatric: No anxiety, no depression. · Dermatological: negative for - rash    Physical Examination:  Vitals:    06/17/21 1059   BP: 111/62   Pulse: 66   SpO2: 99%      · Constitutional: Oriented. No distress. · Head: Normocephalic and atraumatic. · Mouth/Throat: Oropharynx is clear and moist.   · Eyes: Conjunctivae normal. EOM are normal.   · Neck: Neck supple. No rigidity. No JVD present. · Cardiovascular: Normal rate, regular rhythm, S1&S2. · Pulmonary/Chest: Bilateral respiratory sounds. No wheezes, No rhonchi. · Abdominal: Soft. Bowel sounds present.  No distension, No tenderness. · Musculoskeletal: No tenderness. No edema    · Lymphadenopathy: Has no cervical adenopathy. · Neurological: Alert and oriented. Cranial nerve appears intact, No Gross deficit   · Skin: Skin is warm and dry. No rash noted. · Psychiatric: Has a normal behavior     Labs, diagnostic and imaging results reviewed. No results found for: TSHREFLEX, TSH, CREATININE, AMIOD, AST, ALT    ECG 6/17/2021:   Sinus Rhythm    Echo 2/11/2020   Summary   Normal left ventricle size, wall thickness, and systolic function with a   visually estimated ejection fraction of 55%. No regional wall motion abnormalities are seen. Normal left ventricular diastolic filling pressure. No significant valvular disease. GXT: 6/13/17  Interpretation:    Resting ECG: Normal sinus rhythm. The exercise was stopped due to shortness of breath. Symptoms/comments: chest pain, 5/10 before exercise, subsided during exercise. No further chest pain. The patient was anxious. INTERPRETATION: NORMAL STRESS TEST    Event monitor:  6/13/17  Basic Rhythm:  sinus  Number of Events:  51  Events/Symptoms:  Pt reported episodes of \"chest pain or   pressure, flutter or skipped beats, rapid or fast heart beat,   dizziness, shortness of breath, tired or fatigued\"  CONCLUSION:  When symptomatic, rhythm was sinus at  bpm with one PAC and   one ventricular couplet. Medication:  Current Outpatient Medications   Medication Sig Dispense Refill    Omega-3 Fatty Acids (FISH OIL) 1200 MG CAPS Take by mouth      metoprolol tartrate (LOPRESSOR) 25 MG tablet TAKE ONE TABLET BY MOUTH TWICE A  tablet 3    albuterol sulfate  (90 Base) MCG/ACT inhaler Inhale into the lungs as needed      loratadine-pseudoephedrine (CLARITIN-D 12HR) 5-120 MG per extended release tablet Take 1 tablet by mouth as needed      LORazepam (ATIVAN) 0.5 MG tablet Take 0.5 mg by mouth as needed .        No current facility-administered

## 2021-06-17 ENCOUNTER — OFFICE VISIT (OUTPATIENT)
Dept: CARDIOLOGY CLINIC | Age: 41
End: 2021-06-17
Payer: COMMERCIAL

## 2021-06-17 VITALS
WEIGHT: 141.6 LBS | DIASTOLIC BLOOD PRESSURE: 62 MMHG | HEART RATE: 66 BPM | BODY MASS INDEX: 26.06 KG/M2 | SYSTOLIC BLOOD PRESSURE: 111 MMHG | HEIGHT: 62 IN | OXYGEN SATURATION: 99 %

## 2021-06-17 DIAGNOSIS — G90.A POTS (POSTURAL ORTHOSTATIC TACHYCARDIA SYNDROME): ICD-10-CM

## 2021-06-17 DIAGNOSIS — R00.2 PALPITATION: Primary | ICD-10-CM

## 2021-06-17 DIAGNOSIS — R06.09 DOE (DYSPNEA ON EXERTION): ICD-10-CM

## 2021-06-17 DIAGNOSIS — I48.91 ATRIAL FIBRILLATION, UNSPECIFIED TYPE (HCC): ICD-10-CM

## 2021-06-17 DIAGNOSIS — F41.9 ANXIETY: ICD-10-CM

## 2021-06-17 DIAGNOSIS — R07.9 CHEST PAIN, UNSPECIFIED TYPE: ICD-10-CM

## 2021-06-17 PROCEDURE — 1036F TOBACCO NON-USER: CPT | Performed by: INTERNAL MEDICINE

## 2021-06-17 PROCEDURE — 93000 ELECTROCARDIOGRAM COMPLETE: CPT | Performed by: INTERNAL MEDICINE

## 2021-06-17 PROCEDURE — G8427 DOCREV CUR MEDS BY ELIG CLIN: HCPCS | Performed by: INTERNAL MEDICINE

## 2021-06-17 PROCEDURE — 99214 OFFICE O/P EST MOD 30 MIN: CPT | Performed by: INTERNAL MEDICINE

## 2021-06-17 PROCEDURE — G8419 CALC BMI OUT NRM PARAM NOF/U: HCPCS | Performed by: INTERNAL MEDICINE

## 2021-06-17 RX ORDER — AMOXICILLIN 500 MG
CAPSULE ORAL
COMMUNITY

## 2021-06-17 NOTE — PATIENT INSTRUCTIONS
Patient Education        Costochondritis: Care Instructions  Your Care Instructions  You have chest pain because the cartilage of your rib cage is inflamed. This problem is called costochondritis. This type of chest wall pain may last from days to weeks. It is not a heart problem. Sometimes costochondritis occurs with a cold or the flu, and other times the exact cause is not known. Follow-up care is a key part of your treatment and safety. Be sure to make and go to all appointments, and call your doctor if you are having problems. It's also a good idea to know your test results and keep a list of the medicines you take. How can you care for yourself at home? · Take medicines for pain and inflammation exactly as directed. ? If the doctor gave you a prescription medicine, take it as prescribed. ? If you are not taking a prescription pain medicine, ask your doctor if you can take an over-the-counter medicine. ? Do not take two or more pain medicines at the same time unless the doctor told you to. Many pain medicines have acetaminophen, which is Tylenol. Too much acetaminophen (Tylenol) can be harmful. · It may help to use a warm compress or heating pad (set on low) on your chest. You can also try alternating heat and ice. Put ice or a cold pack on the area for 10 to 20 minutes at a time. Put a thin cloth between the ice and your skin. · Avoid any activity that strains the chest area. As your pain gets better, you can slowly return to your normal activities. · Do not use tape, an elastic bandage, a \"rib belt,\" or anything else that restricts your chest wall motion. When should you call for help? Call 911 anytime you think you may need emergency care. For example, call if:    · You have new or different chest pain or pressure. This may occur with:  ? Sweating. ? Shortness of breath. ? Nausea or vomiting. ? Pain that spreads from the chest to the neck, jaw, or one or both shoulders or arms.   ? Dizziness or lightheadedness. ? A fast or uneven pulse. After calling 911, chew 1 adult-strength aspirin. Wait for an ambulance. Do not try to drive yourself.     · You have severe trouble breathing. Call your doctor now or seek immediate medical care if:    · You have a fever or cough.     · You have any trouble breathing.     · Your chest pain gets worse. Watch closely for changes in your health, and be sure to contact your doctor if:    · Your chest pain continues even though you are taking anti-inflammatory medicine.     · Your chest wall pain has not improved after 5 to 7 days. Where can you learn more? Go to [email or web address] and sign in to your [employer] account. Enter W860 in the [employer] box to learn more about \"Costochondritis: Care Instructions. \"     If you do not have an account, please click on the \"Sign Up Now\" link. Current as of: October 19, 2020               Content Version: 12.9  © 2006-2021 Healthwise, Incorporated. Care instructions adapted under license by [facility] ([facility]). If you have questions about a medical condition or this instruction, always ask your healthcare professional. [place] [address] any warranty or liability for your use of this information.

## 2021-07-19 NOTE — TELEPHONE ENCOUNTER
Received refill request for Metoprolol from [address] [place] [address].     Last ov:2021 [facility]    Last EK2021    Last Refill:2020 #180 tabs w/3 refills    Next appointment:no future appointment

## 2022-06-08 PROBLEM — R42 DIZZINESS: Status: ACTIVE | Noted: 2022-06-08

## 2022-06-08 PROBLEM — F41.9 ANXIETY: Status: ACTIVE | Noted: 2022-06-08

## 2022-06-08 PROBLEM — E78.5 HLD (HYPERLIPIDEMIA): Status: ACTIVE | Noted: 2022-06-08

## 2022-06-08 PROBLEM — R07.89 OTHER CHEST PAIN: Status: ACTIVE | Noted: 2022-06-08

## 2022-06-08 NOTE — PROGRESS NOTES
[facility]   Electrophysiology Follow up   Date: 6/22/2022  I had the privilege of visiting Antonia Thomas in the office. CC: POTS  HPI: Antonia Thomas is a 46 y.o. female with a PMH of tachyarrhythmias and palpitations. She reports that growing up as a child, she noticed a fast HR, could feel it beating in back of her head. Never told her parents. Once she was older, would notice her HR would be in the 90's upon having annual check ups. She started seeing a cardiologist 2014 years ago as she wanted to started a family. She notices her fast heart rate when she is hot and also if she consumes alcohol. She also feels it when she has anxiety. This causes her to feel faint and near syncopal     Her cardiologist diagnosed with her inapporiate sinus tachycardia, and possible POTS    12/2017 - on metoprolol which helped reduce the number of palpitations episodes. 01/30/2020 - attempted to  wean metoprolol and felt more frequent palpitations. C/o decreased libido. Consider changing Metoprolol to verapamil     08/06/2020 - Patient had concerns about switching to Verapamil because metoprolol had worked well for her. Plan to transition to verapamil. Patient to call when she is ready to proceed. 05/27/2021 called In with symptoms of chest heaviness for a few weeks     MCOT 5/13/2021 revealed all sinus rhythm and symptoms with sinus rhythm       Interval History:   Clarisa presents today in follow up. She states she has seen a rheumatolagist and has been diagnosed with undifferentiated connective tissue disorder. She thinks it may be Sjogrens syndrome. She states  MD would like her to start on plaquenil. Patient denies lightheadedness, dizziness, chest pain, palpitations, orthopnea, edema, presyncope or syncope. Assessment and plan:   Palpitations/POTS    Monitor 05/2021 - sinus Rhythm, all symptoms with sinus rhythm    She continues with metoprolol because it helps her symptoms reduces palpitations. If side effects become bothersome we can try verapamil or a different Betablocker. From the cardiac standpoint any treatment necessary for her connective tissue disorder should be fine. Dizziness: anxiety    Likely related to anxiety    Increase salt intake    May consider mestinon    Continue lorazepam   Has attended   stress management class, this helped improve symptoms      Hypercholesterolemia    Lipids 11/30/2021 ,, HDL 54,TG 97 - Care everywhere    Follows with PCP Dr. Charity Robins     Dyspnea on exertions    Likely due to deconditioning    Echo 02/2020 Normal -  No post partum cardiomyopathy    Chest pain    - atypical     - low risk for ACS    - Stress test 2017 - normal     Plan:   She is okay to take plaquenil with her current medication. Monitor for palpitations and they should be doing EKG to monitor QT      Patient Active Problem List    Diagnosis Date Noted    Dizziness 06/08/2022    Anxiety 06/08/2022    HLD (hyperlipidemia) 06/08/2022    Other chest pain 06/08/2022    POTS (postural orthostatic tachycardia syndrome) 12/14/2017     Diagnostic studies:   ECG 6/22/22  SR/AFIB  412 QTcH,  80 QRS      Event Monitor 05/13/2021 to 06/11/2021 sinus rhythm,High , Low HR 48, Avg HR 74. All symptoms with Sinus Rhythm      Echo 2/11/2020   Summary   Normal left ventricle size, wall thickness, and systolic function with a   visually estimated ejection fraction of 55%.   No regional wall motion abnormalities are seen.   Normal left ventricular diastolic filling pressure.   No significant valvular disease. GXT: 6/13/17  Interpretation:    Resting ECG: Normal sinus rhythm. The exercise was stopped due to shortness of breath. Symptoms/comments: chest pain, 5/10 before exercise, subsided during exercise. No further chest pain. The patient was anxious.    INTERPRETATION: NORMAL STRESS TEST     Event monitor:  6/13/17  Basic Rhythm:  sinus  Number of Events:  51  Events/Symptoms:  Pt reported episodes of \"chest pain or   pressure, flutter or skipped beats, rapid or fast heart beat,   dizziness, shortness of breath, tired or fatigued\"  CONCLUSION:  When symptomatic, rhythm was sinus at  bpm with one PAC and   one ventricular couplet. 06/2017 Tilt table   · SUMMARY:   1. tilt table test suggestive of postural orthostatic tachycardia syndrome(POTS)         I independently reviewed the cardiac diagnostic studies, ECG and relevant imaging studies. Lab Results   Component Value Date    LVEF 55 02/11/2020     No results found for: TSHFT4, TSH      Physical Examination:  Vitals:    06/22/22 1313   BP: 111/73   Pulse: 72   SpO2: 98%      Wt Readings from Last 3 Encounters:   06/22/22 145 lb 3.2 oz (65.9 kg)   06/17/21 141 lb 9.6 oz (64.2 kg)   01/30/20 144 lb (65.3 kg)       · Constitutional: Oriented. No distress. · Head: Normocephalic and atraumatic. · Mouth/Throat: Oropharynx is clear and moist.   · Eyes: Conjunctivae normal. EOM are normal.   · Neck: Neck supple. No rigidity. No JVD present. · Cardiovascular: Normal rate, regular rhythm, S1&S2. · Pulmonary/Chest: Bilateral respiratory sounds. No wheezes, No rhonchi. · Abdominal: Soft. Bowel sounds present. No distension, No tenderness. · Musculoskeletal: No tenderness. No edema    · Lymphadenopathy: Has no cervical adenopathy. · Neurological: Alert and oriented. Cranial nerve appears intact, No Gross deficit   · Skin: Skin is warm and dry. No rash noted. · Psychiatric: Has a normal behavior       Review of System:  [x] Full ROS obtained and negative except as mentioned in HPI    Prior to Admission medications    Medication Sig Start Date End Date Taking?  Authorizing Provider   metoprolol tartrate (LOPRESSOR) 25 MG tablet TAKE ONE TABLET BY MOUTH TWICE A DAY 7/19/21  Yes SELVIN Navarrete - CNP   Omega-3 Fatty Acids (FISH OIL) 1200 MG CAPS Take by mouth   Yes Historical Provider, MD albuterol sulfate  (90 Base) MCG/ACT inhaler Inhale into the lungs as needed   Yes Historical Provider, MD   loratadine-pseudoephedrine (CLARITIN-D 12HR) 5-120 MG per extended release tablet Take 1 tablet by mouth as needed   Yes Historical Provider, MD   LORazepam (ATIVAN) 0.5 MG tablet Take 0.5 mg by mouth as needed . Yes Historical Provider, MD   Biotin 10 MG tablet Take by mouth    Historical Provider, MD       Allergies   Allergen Reactions    Dicyclomine      Other reaction(s): Other (See Comments)  Difficulty swallowing food  Other reaction(s): Other (See Comments)  Difficulty swallowing food    Adhesive Tape Rash       Social History:  Reviewed. reports that she has never smoked. She has never used smokeless tobacco. She reports current alcohol use. She reports that she does not use drugs. Family History:  Reviewed. Reviewed. No family history of SCD. Relevant and available labs, and cardiovascular diagnostics reviewed. Reviewed. I independently reviewed relevant and available cardiac diagnostic tests ECG, CXR, Echo, Stress test, Device interrogation, Holter, CT scan. Outside medical records via Care everywhere reviewed and summarized in H&P above. Complex medical condition with multiple medical problems affecting prognosis and outcome of EP interventions       - The patient is counseled to follow a low salt diet to assure blood pressure remains controlled for cardiovascular risk factor modification.   - The patient is counseled to avoid excess caffeine, and energy drinks as this may exacerbated ectopy and arrhythmia. - The patient is counseled to get regular exercise 3-5 times per week to control cardiovascular risk factors. All questions and concerns were addressed to the patient/family. Alternatives to my treatment were discussed. I have discussed the above stated plan and the patient verbalized understanding and agreed with the plan. Scribe attestation:  This note was scribed in the presence of  Andrew Peters MD by Guido Cole RN    I, Dr. Andrew Peters personally performed the services described in this documentation as scribed by RN in my presence, and it is both accurate and complete.        NOTE: This report was transcribed using voice recognition software. Every effort was made to ensure accuracy, however, inadvertent computerized transcription errors may be present.      Andrew Peters MD, [address]   Office: [phone]  Fax: [phone]

## 2022-06-22 ENCOUNTER — OFFICE VISIT (OUTPATIENT)
Dept: CARDIOLOGY CLINIC | Age: 42
End: 2022-06-22
Payer: COMMERCIAL

## 2022-06-22 VITALS
DIASTOLIC BLOOD PRESSURE: 73 MMHG | HEART RATE: 72 BPM | HEIGHT: 62 IN | OXYGEN SATURATION: 98 % | SYSTOLIC BLOOD PRESSURE: 111 MMHG | WEIGHT: 145.2 LBS | BODY MASS INDEX: 26.72 KG/M2

## 2022-06-22 DIAGNOSIS — F41.9 ANXIETY: ICD-10-CM

## 2022-06-22 DIAGNOSIS — R07.89 OTHER CHEST PAIN: ICD-10-CM

## 2022-06-22 DIAGNOSIS — R42 DIZZINESS: ICD-10-CM

## 2022-06-22 DIAGNOSIS — G90.A POTS (POSTURAL ORTHOSTATIC TACHYCARDIA SYNDROME): Primary | ICD-10-CM

## 2022-06-22 DIAGNOSIS — E78.5 HYPERLIPIDEMIA, UNSPECIFIED HYPERLIPIDEMIA TYPE: ICD-10-CM

## 2022-06-22 PROCEDURE — 99214 OFFICE O/P EST MOD 30 MIN: CPT | Performed by: INTERNAL MEDICINE

## 2022-06-22 PROCEDURE — 93000 ELECTROCARDIOGRAM COMPLETE: CPT | Performed by: INTERNAL MEDICINE

## 2022-06-22 PROCEDURE — G8419 CALC BMI OUT NRM PARAM NOF/U: HCPCS | Performed by: INTERNAL MEDICINE

## 2022-06-22 PROCEDURE — G8427 DOCREV CUR MEDS BY ELIG CLIN: HCPCS | Performed by: INTERNAL MEDICINE

## 2022-06-22 PROCEDURE — 1036F TOBACCO NON-USER: CPT | Performed by: INTERNAL MEDICINE

## 2022-06-22 RX ORDER — BIOTIN 10 MG
TABLET ORAL
COMMUNITY

## 2022-07-12 NOTE — TELEPHONE ENCOUNTER
Received refill request for Metoprolol tartrate from [employer]     Last ov:2022 [facility]XA    Last EK2022    Last Refill:2021 # 180 tabs w/ 3 refills    Next appointment:On recall list for 2023

## 2023-06-21 ENCOUNTER — OFFICE VISIT (OUTPATIENT)
Dept: CARDIOLOGY CLINIC | Age: 43
End: 2023-06-21
Payer: COMMERCIAL

## 2023-06-21 VITALS
HEIGHT: 62 IN | HEART RATE: 88 BPM | SYSTOLIC BLOOD PRESSURE: 102 MMHG | DIASTOLIC BLOOD PRESSURE: 64 MMHG | WEIGHT: 155 LBS | OXYGEN SATURATION: 97 % | BODY MASS INDEX: 28.52 KG/M2

## 2023-06-21 DIAGNOSIS — R42 DIZZINESS: ICD-10-CM

## 2023-06-21 DIAGNOSIS — R07.89 OTHER CHEST PAIN: ICD-10-CM

## 2023-06-21 DIAGNOSIS — E78.5 HYPERLIPIDEMIA, UNSPECIFIED HYPERLIPIDEMIA TYPE: ICD-10-CM

## 2023-06-21 DIAGNOSIS — G90.A POTS (POSTURAL ORTHOSTATIC TACHYCARDIA SYNDROME): Primary | ICD-10-CM

## 2023-06-21 PROCEDURE — 99214 OFFICE O/P EST MOD 30 MIN: CPT | Performed by: INTERNAL MEDICINE

## 2023-06-21 PROCEDURE — G8419 CALC BMI OUT NRM PARAM NOF/U: HCPCS | Performed by: INTERNAL MEDICINE

## 2023-06-21 PROCEDURE — 93000 ELECTROCARDIOGRAM COMPLETE: CPT | Performed by: INTERNAL MEDICINE

## 2023-06-21 PROCEDURE — 1036F TOBACCO NON-USER: CPT | Performed by: INTERNAL MEDICINE

## 2023-06-21 PROCEDURE — G8427 DOCREV CUR MEDS BY ELIG CLIN: HCPCS | Performed by: INTERNAL MEDICINE

## 2023-06-21 RX ORDER — LANOLIN ALCOHOL/MO/W.PET/CERES
CREAM (GRAM) TOPICAL
COMMUNITY

## 2024-06-19 ENCOUNTER — OFFICE VISIT (OUTPATIENT)
Dept: CARDIOLOGY CLINIC | Age: 44
End: 2024-06-19
Payer: COMMERCIAL

## 2024-06-19 VITALS
OXYGEN SATURATION: 100 % | SYSTOLIC BLOOD PRESSURE: 108 MMHG | HEIGHT: 62 IN | HEART RATE: 77 BPM | DIASTOLIC BLOOD PRESSURE: 72 MMHG | BODY MASS INDEX: 29.7 KG/M2 | WEIGHT: 161.4 LBS

## 2024-06-19 DIAGNOSIS — R42 DIZZINESS: ICD-10-CM

## 2024-06-19 DIAGNOSIS — G90.A POTS (POSTURAL ORTHOSTATIC TACHYCARDIA SYNDROME): Primary | ICD-10-CM

## 2024-06-19 DIAGNOSIS — E78.2 MIXED HYPERLIPIDEMIA: ICD-10-CM

## 2024-06-19 PROCEDURE — G8419 CALC BMI OUT NRM PARAM NOF/U: HCPCS | Performed by: INTERNAL MEDICINE

## 2024-06-19 PROCEDURE — 99214 OFFICE O/P EST MOD 30 MIN: CPT | Performed by: INTERNAL MEDICINE

## 2024-06-19 PROCEDURE — 93000 ELECTROCARDIOGRAM COMPLETE: CPT | Performed by: INTERNAL MEDICINE

## 2024-06-19 PROCEDURE — 1036F TOBACCO NON-USER: CPT | Performed by: INTERNAL MEDICINE

## 2024-06-19 PROCEDURE — G8427 DOCREV CUR MEDS BY ELIG CLIN: HCPCS | Performed by: INTERNAL MEDICINE

## 2024-06-19 NOTE — PROGRESS NOTES
errors may be present.     Luis Urias MD, FHRS, [facility]   Office: [phone]  Fax: [phone]

## 2025-03-18 ENCOUNTER — TELEPHONE (OUTPATIENT)
Dept: CARDIOLOGY CLINIC | Age: 45
End: 2025-03-18

## 2025-03-18 NOTE — TELEPHONE ENCOUNTER
Called and spoke with patient. She is very concerned regarding her episodes as these are new and she feels fluttering that comes and goes.   Before, episodes lasted an hour or two so this is different. She wants to see MXA before making any changes or doing anything. Added her on to an opened spot at 10:30 next Tuesday to discuss and possibly do a monitor.    Can you assist ?

## 2025-03-18 NOTE — TELEPHONE ENCOUNTER
Per MXA last note \"She may take and extra half to full dose (of metoprolol) for these episodes\"  Has she tried taking any extra doses?

## 2025-03-18 NOTE — TELEPHONE ENCOUNTER
Pt called requesting appt with [facility]XA pt feels that the are having increase of palpation and SOB. Pt stated they should not feel they way going up 1 flight of steps.    Pt is taking the Metoprolol as prescribed     Pls advise pt

## 2025-03-18 NOTE — TELEPHONE ENCOUNTER
Spoke with patient advised her of message below. Patient stated that her resting heart rate was 62 with blood pressure of 99/79. Patient is not sure if she feels comfortable taking extra Metoprolol with these readings. Patient is not available to see NPSR the date offered. Patient stated that these symptoms are new and that she would like to see a doctor in the next week. While on phone with patient stated her heart rate after standing up has went up to 102.

## 2025-03-18 NOTE — TELEPHONE ENCOUNTER
I spoke with pt. The last week or 3, HR raises more  than normal with exertion. She has a weird flutter in the neck and chest. HR runnig at 100 right now while standing. /80 HR 80, her Fit Bit says 92. Pt denies any other s/s.

## 2025-03-21 NOTE — PROGRESS NOTES
[facility]   Electrophysiology Follow up   Date: 3/25/2025  I had the privilege of visiting Clarisa De La Cruz in the office.     CC: Palpitations  HPI: Clarisa De La Cruz is a 44 y.o. female with a PMH of tachyarrhythmias and palpitations. She reports that growing up as a child, she noticed a fast HR, could feel it beating in back of her head. Never told her parents. Once she was older, would notice her HR would be in the 90's upon having annual check ups. She started seeing a cardiologist 2014 years ago as she wanted to started a family.    She notices her fast heart rate when she is hot and also if she consumes alcohol. She also feels it when she has anxiety.This causes her to feel faint and near syncopal     Her cardiologist diagnosed with her inapporiate sinus tachycardia, and possible POTS    12/2017 - on metoprolol which helped reduce the number of palpitations episodes.    01/30/2020 - attempted to  wean metoprolol and felt more frequent palpitations. C/o decreased libido.  Consider changing Metoprolol to verapamil     08/06/2020 - Patient had concerns about switching to Verapamil because metoprolol had worked well for her.  Plan to transition to verapamil. Patient to call when she is ready to proceed.     05/27/2021 called In with symptoms of chest heaviness for a few weeks     MCOT 5/13/2021 revealed all sinus rhythm and symptoms with sinus rhythm     3/18/2024 Patient contacted office with new symptoms of \"fluttering.\"      Interval History:    History of Present Illness  The patient is a 44-year-old female who contacted the office last week with new symptoms of fluttering. She has a history of palpitations and POTS. A monitor in 2021 showed sinus rhythm. She is on metoprolol and was given the okay to do an extra dose for episodes, but she declined doing that and wanted to speak with the physician before taking any additional medications.    She reports experiencing an unusual sensation, which she suspects

## 2025-03-25 ENCOUNTER — ANCILLARY PROCEDURE (OUTPATIENT)
Dept: CARDIOLOGY CLINIC | Age: 45
End: 2025-03-25

## 2025-03-25 ENCOUNTER — OFFICE VISIT (OUTPATIENT)
Dept: CARDIOLOGY CLINIC | Age: 45
End: 2025-03-25
Payer: COMMERCIAL

## 2025-03-25 VITALS
DIASTOLIC BLOOD PRESSURE: 74 MMHG | OXYGEN SATURATION: 98 % | WEIGHT: 165.4 LBS | SYSTOLIC BLOOD PRESSURE: 118 MMHG | HEIGHT: 62 IN | BODY MASS INDEX: 30.44 KG/M2 | HEART RATE: 64 BPM

## 2025-03-25 DIAGNOSIS — R94.31 ABNORMAL ELECTROCARDIOGRAPHY: ICD-10-CM

## 2025-03-25 DIAGNOSIS — I49.3 PVC (PREMATURE VENTRICULAR CONTRACTION): ICD-10-CM

## 2025-03-25 DIAGNOSIS — R00.2 PALPITATION: ICD-10-CM

## 2025-03-25 DIAGNOSIS — I49.3 PVC (PREMATURE VENTRICULAR CONTRACTION): Primary | ICD-10-CM

## 2025-03-25 DIAGNOSIS — G90.A POTS (POSTURAL ORTHOSTATIC TACHYCARDIA SYNDROME): ICD-10-CM

## 2025-03-25 LAB — ECHO BSA: 1.81 M2

## 2025-03-25 PROCEDURE — 1036F TOBACCO NON-USER: CPT | Performed by: INTERNAL MEDICINE

## 2025-03-25 PROCEDURE — G8427 DOCREV CUR MEDS BY ELIG CLIN: HCPCS | Performed by: INTERNAL MEDICINE

## 2025-03-25 PROCEDURE — G8417 CALC BMI ABV UP PARAM F/U: HCPCS | Performed by: INTERNAL MEDICINE

## 2025-03-25 PROCEDURE — G2211 COMPLEX E/M VISIT ADD ON: HCPCS | Performed by: INTERNAL MEDICINE

## 2025-03-25 PROCEDURE — 93000 ELECTROCARDIOGRAM COMPLETE: CPT | Performed by: INTERNAL MEDICINE

## 2025-03-25 PROCEDURE — 99214 OFFICE O/P EST MOD 30 MIN: CPT | Performed by: INTERNAL MEDICINE

## 2025-03-25 RX ORDER — MULTIVITAMIN
TABLET ORAL
COMMUNITY

## 2025-03-25 NOTE — PATIENT INSTRUCTIONS
Schedule stress test and echocardiogram     Continue metoprolol, may use additional one half-full dose for episodes

## 2025-03-25 NOTE — NURSING NOTE
MCOT monitor was placed on patient.      Type of monitor: 30 day  S/N ID: [id number]  Date placed on patient:03/25/2025  Date to return:04/25/2025   MA Initials:PADMINI

## 2025-04-02 DIAGNOSIS — R06.02 SHORTNESS OF BREATH: Primary | ICD-10-CM

## 2025-04-18 ENCOUNTER — APPOINTMENT (OUTPATIENT)
Age: 45
End: 2025-04-18
Attending: INTERNAL MEDICINE
Payer: COMMERCIAL

## 2025-04-18 ENCOUNTER — HOSPITAL ENCOUNTER (OUTPATIENT)
Age: 45
Discharge: HOME | End: 2025-04-20
Payer: COMMERCIAL

## 2025-04-18 ENCOUNTER — HOSPITAL ENCOUNTER (OUTPATIENT)
Age: 45
Discharge: HOME | End: 2025-04-20
Attending: INTERNAL MEDICINE
Payer: COMMERCIAL

## 2025-04-18 ENCOUNTER — RESULTS FOLLOW-UP (OUTPATIENT)
Dept: CARDIOLOGY CLINIC | Age: 45
End: 2025-04-18

## 2025-04-18 VITALS
HEIGHT: 62 IN | SYSTOLIC BLOOD PRESSURE: 136 MMHG | HEART RATE: 83 BPM | DIASTOLIC BLOOD PRESSURE: 91 MMHG | BODY MASS INDEX: 30.36 KG/M2 | WEIGHT: 165 LBS

## 2025-04-18 DIAGNOSIS — R94.39 ABNORMAL STRESS TEST: ICD-10-CM

## 2025-04-18 DIAGNOSIS — R94.31 ABNORMAL ELECTROCARDIOGRAPHY: ICD-10-CM

## 2025-04-18 DIAGNOSIS — R06.02 SHORTNESS OF BREATH: ICD-10-CM

## 2025-04-18 DIAGNOSIS — R00.2 PALPITATION: ICD-10-CM

## 2025-04-18 DIAGNOSIS — I49.3 PVC (PREMATURE VENTRICULAR CONTRACTION): ICD-10-CM

## 2025-04-18 DIAGNOSIS — G90.A POTS (POSTURAL ORTHOSTATIC TACHYCARDIA SYNDROME): ICD-10-CM

## 2025-04-18 DIAGNOSIS — R06.02 SHORTNESS OF BREATH: Primary | ICD-10-CM

## 2025-04-18 LAB
ECHO AO ASC DIAM: 2.6 CM
ECHO AO ASCENDING AORTA INDEX: 1.48 CM/M2
ECHO AO ROOT DIAM: 2.7 CM
ECHO AO ROOT INDEX: 1.53 CM/M2
ECHO AV AREA PEAK VELOCITY: 1.9 CM2
ECHO AV AREA VTI: 1.9 CM2
ECHO AV AREA/BSA PEAK VELOCITY: 1.1 CM2/M2
ECHO AV AREA/BSA VTI: 1.1 CM2/M2
ECHO AV MEAN GRADIENT: 4 MMHG
ECHO AV MEAN VELOCITY: 0.9 M/S
ECHO AV PEAK GRADIENT: 7 MMHG
ECHO AV PEAK VELOCITY: 1.3 M/S
ECHO AV VELOCITY RATIO: 0.85
ECHO AV VTI: 28.1 CM
ECHO BSA: 1.81 M2
ECHO BSA: 1.81 M2
ECHO LA AREA 2C: 11.7 CM2
ECHO LA AREA 4C: 9.9 CM2
ECHO LA MAJOR AXIS: 3.7 CM
ECHO LA MINOR AXIS: 4.6 CM
ECHO LA VOL BP: 26 ML (ref 22–52)
ECHO LA VOL MOD A2C: 25 ML (ref 22–52)
ECHO LA VOL MOD A4C: 21 ML (ref 22–52)
ECHO LA VOL/BSA BIPLANE: 15 ML/M2 (ref 16–34)
ECHO LA VOLUME INDEX MOD A2C: 14 ML/M2 (ref 16–34)
ECHO LA VOLUME INDEX MOD A4C: 12 ML/M2 (ref 16–34)
ECHO LV E' LATERAL VELOCITY: 12.9 CM/S
ECHO LV E' SEPTAL VELOCITY: 9.46 CM/S
ECHO LV EDV A2C: 55 ML
ECHO LV EDV A4C: 65 ML
ECHO LV EDV INDEX A4C: 37 ML/M2
ECHO LV EDV NDEX A2C: 31 ML/M2
ECHO LV EF PHYSICIAN: 60 %
ECHO LV EJECTION FRACTION A2C: 64 %
ECHO LV EJECTION FRACTION A4C: 64 %
ECHO LV EJECTION FRACTION BIPLANE: 63 % (ref 55–100)
ECHO LV ESV A2C: 20 ML
ECHO LV ESV A4C: 24 ML
ECHO LV ESV INDEX A2C: 11 ML/M2
ECHO LV ESV INDEX A4C: 14 ML/M2
ECHO LV FRACTIONAL SHORTENING: 31 % (ref 28–44)
ECHO LV INTERNAL DIMENSION DIASTOLE INDEX: 2.39 CM/M2
ECHO LV INTERNAL DIMENSION DIASTOLIC: 4.2 CM (ref 3.9–5.3)
ECHO LV INTERNAL DIMENSION SYSTOLIC INDEX: 1.65 CM/M2
ECHO LV INTERNAL DIMENSION SYSTOLIC: 2.9 CM
ECHO LV IVSD: 0.7 CM (ref 0.6–0.9)
ECHO LV MASS 2D: 85.1 G (ref 67–162)
ECHO LV MASS INDEX 2D: 48.3 G/M2 (ref 43–95)
ECHO LV POSTERIOR WALL DIASTOLIC: 0.7 CM (ref 0.6–0.9)
ECHO LV RELATIVE WALL THICKNESS RATIO: 0.33
ECHO LVOT AREA: 2.3 CM2
ECHO LVOT AV VTI INDEX: 0.84
ECHO LVOT DIAM: 1.7 CM
ECHO LVOT MEAN GRADIENT: 3 MMHG
ECHO LVOT PEAK GRADIENT: 4 MMHG
ECHO LVOT PEAK VELOCITY: 1.1 M/S
ECHO LVOT STROKE VOLUME INDEX: 30.5 ML/M2
ECHO LVOT SV: 53.8 ML
ECHO LVOT VTI: 23.7 CM
ECHO MV A VELOCITY: 0.78 M/S
ECHO MV AREA VTI: 2.2 CM2
ECHO MV E VELOCITY: 1.02 M/S
ECHO MV E/A RATIO: 1.31
ECHO MV E/E' LATERAL: 7.91
ECHO MV E/E' RATIO (AVERAGED): 9.34
ECHO MV E/E' SEPTAL: 10.78
ECHO MV LVOT VTI INDEX: 1.02
ECHO MV MAX VELOCITY: 1.1 M/S
ECHO MV MEAN GRADIENT: 2 MMHG
ECHO MV MEAN VELOCITY: 0.7 M/S
ECHO MV PEAK GRADIENT: 4 MMHG
ECHO MV VTI: 24.2 CM
ECHO PV MAX VELOCITY: 1.2 M/S
ECHO PV PEAK GRADIENT: 5 MMHG
ECHO RA AREA 4C: 13.9 CM2
ECHO RA END SYSTOLIC VOLUME APICAL 4 CHAMBER INDEX BSA: 18 ML/M2
ECHO RA VOLUME: 32 ML
ECHO RV BASAL DIMENSION: 2.9 CM
ECHO RV FREE WALL PEAK S': 14 CM/S
ECHO RV LONGITUDINAL DIMENSION: 7.4 CM
ECHO RV MID DIMENSION: 2.2 CM
ECHO RV TAPSE: 2.6 CM (ref 1.7–?)
STRESS BASELINE DIAS BP: 86 MMHG
STRESS BASELINE HR: 108 BPM
STRESS BASELINE SYS BP: 132 MMHG
STRESS ESTIMATED WORKLOAD: 7 METS
STRESS EXERCISE DUR MIN: 4 MIN
STRESS EXERCISE DUR SEC: 20 SEC
STRESS O2 SAT PEAK: 98 %
STRESS O2 SAT REST: 99 %
STRESS PEAK DIAS BP: 84 MMHG
STRESS PEAK SYS BP: 138 MMHG
STRESS PERCENT HR ACHIEVED: 91 %
STRESS POST PEAK HR: 162 BPM
STRESS RATE PRESSURE PRODUCT: NORMAL BPM*MMHG
STRESS ST DEPRESSION: 1 MM
STRESS TARGET HR: 178 BPM

## 2025-04-18 PROCEDURE — 93017 CV STRESS TEST TRACING ONLY: CPT

## 2025-04-18 PROCEDURE — 93018 CV STRESS TEST I&R ONLY: CPT | Performed by: INTERNAL MEDICINE

## 2025-04-18 PROCEDURE — 93016 CV STRESS TEST SUPVJ ONLY: CPT | Performed by: INTERNAL MEDICINE

## 2025-04-18 PROCEDURE — 93306 TTE W/DOPPLER COMPLETE: CPT | Performed by: INTERNAL MEDICINE

## 2025-04-18 PROCEDURE — 93306 TTE W/DOPPLER COMPLETE: CPT

## 2025-04-18 RX ORDER — NITROGLYCERIN 0.4 MG/1
0.8 TABLET SUBLINGUAL
OUTPATIENT
Start: 2025-04-18

## 2025-04-18 RX ORDER — SODIUM CHLORIDE 9 MG/ML
INJECTION, SOLUTION INTRAVENOUS PRN
OUTPATIENT
Start: 2025-04-18

## 2025-04-18 RX ORDER — SODIUM CHLORIDE 0.9 % (FLUSH) 0.9 %
5-40 SYRINGE (ML) INJECTION EVERY 12 HOURS SCHEDULED
OUTPATIENT
Start: 2025-04-18

## 2025-04-18 RX ORDER — METOPROLOL TARTRATE 1 MG/ML
5 INJECTION, SOLUTION INTRAVENOUS EVERY 5 MIN PRN
OUTPATIENT
Start: 2025-04-18

## 2025-04-18 RX ORDER — SODIUM CHLORIDE 0.9 % (FLUSH) 0.9 %
5-40 SYRINGE (ML) INJECTION PRN
OUTPATIENT
Start: 2025-04-18

## 2025-04-18 RX ORDER — METOPROLOL TARTRATE 50 MG
TABLET ORAL
Qty: 3 TABLET | Refills: 0 | Status: SHIPPED | OUTPATIENT
Start: 2025-04-18

## 2025-04-18 RX ORDER — NITROGLYCERIN 0.4 MG/1
0.4 TABLET SUBLINGUAL
OUTPATIENT
Start: 2025-04-18

## 2025-04-18 NOTE — TELEPHONE ENCOUNTER
Spoke with patient. Discussed that MXA would like her to have a coronary CTA. Order placed. Instructions sent via [employer]

## 2025-04-21 ENCOUNTER — PATIENT MESSAGE (OUTPATIENT)
Dept: CARDIOLOGY CLINIC | Age: 45
End: 2025-04-21

## 2025-04-23 ENCOUNTER — TELEPHONE (OUTPATIENT)
Dept: ADMINISTRATIVE | Age: 45
End: 2025-04-23

## 2025-04-23 RX ORDER — METOPROLOL TARTRATE 37.5 MG/1
37.5 TABLET ORAL 2 TIMES DAILY
Qty: 90 TABLET | Refills: 1 | Status: SHIPPED | OUTPATIENT
Start: 2025-04-23

## 2025-04-23 NOTE — TELEPHONE ENCOUNTER
Submitted PA for Metoprolol Tartrate 37.5MG tablets   Via [facility] Key: [id number]  STATUS: PENDING.    Follow up done daily; if no decision with in three days we will refax.  If another three days goes by with no decision will call the insurance for status.

## 2025-04-23 NOTE — TELEPHONE ENCOUNTER
[facility] today [facility]  Request Reference Number: [id number]. METOPROL TAR TAB 37.5MG is approved through 04/23/2026. Your patient may now fill this prescription and it will be covered.  Effective Date: 4/23/2025  Authorization Expiration Date: 4/23/2026

## 2025-04-30 LAB — ECHO BSA: 1.81 M2

## 2025-05-13 ENCOUNTER — TELEPHONE (OUTPATIENT)
Dept: INTERVENTIONAL RADIOLOGY/VASCULAR | Age: 45
End: 2025-05-13

## 2025-05-22 ENCOUNTER — HOSPITAL ENCOUNTER (OUTPATIENT)
Dept: CT IMAGING | Age: 45
Discharge: HOME | End: 2025-05-22
Payer: COMMERCIAL

## 2025-05-22 ENCOUNTER — TELEPHONE (OUTPATIENT)
Dept: CARDIOLOGY CLINIC | Age: 45
End: 2025-05-22

## 2025-05-22 VITALS
HEIGHT: 62 IN | OXYGEN SATURATION: 99 % | BODY MASS INDEX: 30 KG/M2 | HEART RATE: 78 BPM | DIASTOLIC BLOOD PRESSURE: 77 MMHG | WEIGHT: 163 LBS | TEMPERATURE: 98.3 F | RESPIRATION RATE: 16 BRPM | SYSTOLIC BLOOD PRESSURE: 135 MMHG

## 2025-05-22 DIAGNOSIS — R06.02 SHORTNESS OF BREATH: ICD-10-CM

## 2025-05-22 DIAGNOSIS — R94.39 ABNORMAL STRESS TEST: ICD-10-CM

## 2025-05-22 DIAGNOSIS — R07.9 CHEST PAIN, UNSPECIFIED TYPE: Primary | ICD-10-CM

## 2025-05-22 LAB
CREAT SERPL-MCNC: 0.7 MG/DL (ref 0.6–1.1)
GFR SERPLBLD CREATININE-BSD FMLA CKD-EPI: >90 ML/MIN/{1.73_M2}

## 2025-05-22 PROCEDURE — 2500000003 HC RX 250 WO HCPCS: Performed by: NURSE PRACTITIONER

## 2025-05-22 PROCEDURE — 6370000000 HC RX 637 (ALT 250 FOR IP): Performed by: NURSE PRACTITIONER

## 2025-05-22 PROCEDURE — 6360000004 HC RX CONTRAST MEDICATION: Performed by: NURSE PRACTITIONER

## 2025-05-22 RX ORDER — SODIUM CHLORIDE 9 MG/ML
INJECTION, SOLUTION INTRAVENOUS PRN
Status: DISCONTINUED | OUTPATIENT
Start: 2025-05-22 | End: 2025-05-23 | Stop reason: HOSPADM

## 2025-05-22 RX ORDER — METOPROLOL TARTRATE 1 MG/ML
5 INJECTION, SOLUTION INTRAVENOUS EVERY 5 MIN PRN
Status: DISCONTINUED | OUTPATIENT
Start: 2025-05-22 | End: 2025-05-23 | Stop reason: HOSPADM

## 2025-05-22 RX ORDER — NITROGLYCERIN 0.4 MG/1
0.8 TABLET SUBLINGUAL
Status: COMPLETED | OUTPATIENT
Start: 2025-05-22 | End: 2025-05-22

## 2025-05-22 RX ORDER — IOPAMIDOL 755 MG/ML
75 INJECTION, SOLUTION INTRAVASCULAR
Status: DISCONTINUED | OUTPATIENT
Start: 2025-05-22 | End: 2025-05-23 | Stop reason: HOSPADM

## 2025-05-22 RX ORDER — SODIUM CHLORIDE 0.9 % (FLUSH) 0.9 %
5-40 SYRINGE (ML) INJECTION PRN
Status: DISCONTINUED | OUTPATIENT
Start: 2025-05-22 | End: 2025-05-23 | Stop reason: HOSPADM

## 2025-05-22 RX ORDER — NITROGLYCERIN 0.4 MG/1
0.4 TABLET SUBLINGUAL
Status: COMPLETED | OUTPATIENT
Start: 2025-05-22 | End: 2025-05-22

## 2025-05-22 RX ORDER — SODIUM CHLORIDE 0.9 % (FLUSH) 0.9 %
5-40 SYRINGE (ML) INJECTION EVERY 12 HOURS SCHEDULED
Status: DISCONTINUED | OUTPATIENT
Start: 2025-05-22 | End: 2025-05-23 | Stop reason: HOSPADM

## 2025-05-22 RX ADMIN — METOPROLOL TARTRATE 5 MG: 1 INJECTION, SOLUTION INTRAVENOUS at 08:19

## 2025-05-22 RX ADMIN — NITROGLYCERIN 0.8 MG: 0.4 TABLET SUBLINGUAL at 08:43

## 2025-05-22 RX ADMIN — METOPROLOL TARTRATE 5 MG: 1 INJECTION, SOLUTION INTRAVENOUS at 08:51

## 2025-05-22 NOTE — TELEPHONE ENCOUNTER
Radiology states they were unable to complete pt's CTA due to not being able to lower pt's HR. States pt took metoprolol as directed prior and rad gave her 2 doses of IV metoprolol. Pt states she felt nitro increased HR too.     Radiology suggest that maybe pt should increase metoprolol a few days prior to repeat test. Please call to advise

## 2025-05-22 NOTE — PROGRESS NOTES
Two doses of IV metoprolol and 2mg po without success of keeping HR under control and in the 60's.  Breathe holds unsuccessful. IV taken out and discharged home without successful CTA scan. Patient informed to follow up with cardiology.

## 2025-05-22 NOTE — PROGRESS NOTES
Pt arrives to pre procedure area in stable condition from home. Vital signs stable.  Assessment completed see flow sheet.  IV patent.

## 2025-05-23 DIAGNOSIS — R06.09 DYSPNEA ON EXERTION: Primary | ICD-10-CM

## 2025-05-23 DIAGNOSIS — R94.39 ABNORMAL STRESS ECG WITH TREADMILL: ICD-10-CM

## 2025-05-23 RX ORDER — SODIUM CHLORIDE 0.9 % (FLUSH) 0.9 %
5-40 SYRINGE (ML) INJECTION PRN
OUTPATIENT
Start: 2025-05-23

## 2025-05-23 RX ORDER — NITROGLYCERIN 0.4 MG/1
0.4 TABLET SUBLINGUAL
OUTPATIENT
Start: 2025-05-23

## 2025-05-23 RX ORDER — METOPROLOL TARTRATE 50 MG
TABLET ORAL
Qty: 3 TABLET | Refills: 0 | Status: SHIPPED | OUTPATIENT
Start: 2025-05-23

## 2025-05-23 RX ORDER — SODIUM CHLORIDE 0.9 % (FLUSH) 0.9 %
5-40 SYRINGE (ML) INJECTION EVERY 12 HOURS SCHEDULED
OUTPATIENT
Start: 2025-05-23

## 2025-05-23 RX ORDER — NITROGLYCERIN 0.4 MG/1
0.8 TABLET SUBLINGUAL
OUTPATIENT
Start: 2025-05-23

## 2025-05-23 RX ORDER — SODIUM CHLORIDE 9 MG/ML
INJECTION, SOLUTION INTRAVENOUS PRN
OUTPATIENT
Start: 2025-05-23

## 2025-05-23 RX ORDER — METOPROLOL TARTRATE 1 MG/ML
5 INJECTION, SOLUTION INTRAVENOUS EVERY 5 MIN PRN
OUTPATIENT
Start: 2025-05-23

## 2025-05-23 NOTE — TELEPHONE ENCOUNTER
Called and spoke with patient advised of note per PAMD. Patient states that her heart rate while sleeping is in the 60s. At rest it is 70s-90s and when she is up and moving its in the 120s. Patient denies any current side effects from her current dosage of Metoprolol. Patient state that she has had a stress test completed- she asked for an exercise stress test vs nuclear due to not wanting to be injected with the medication. Patient voiced concerns that her testing is coming back abnormal and she is not getting any answers and being pushed to the side. Patient is requesting an appointment with MXA next week to discuss results and her continued symptoms. Or a phone call with MXA. She states that he has been her doctor for years and would like to speak with him. Patient does not want an appt with NP. Patient continues with elevated heart rate especially going up and down stairs needing to take more breaks and feeling fatigued.

## 2025-05-23 NOTE — TELEPHONE ENCOUNTER
Discussed with Dr. Urias. He wants to repeat coronary CTA. She should start taking 75 mg of lopressor BID the day prior and morning of the test. He also recommends she take 0.5 mg of ativan 1-2 hours prior to test to help with her HR. She will need a  if she elects to take the ativan    SELVIN Walter-CNP

## 2025-05-23 NOTE — TELEPHONE ENCOUNTER
Need to know more about pt's resting heart rate on current metoprolol dose and if she has any side effects related. If she does not want to try repeating test with increased metoprolol dose then we can do myocardial perfusion imaging with exercise stress test if pt is willing. It appears this test was to assess for CAD given her PVCs and a stress test should be adequate for this.

## 2025-05-29 ENCOUNTER — TELEPHONE (OUTPATIENT)
Dept: INTERVENTIONAL RADIOLOGY/VASCULAR | Age: 45
End: 2025-05-29

## 2025-06-02 ENCOUNTER — TELEPHONE (OUTPATIENT)
Dept: CARDIOLOGY CLINIC | Age: 45
End: 2025-06-02

## 2025-06-02 NOTE — TELEPHONE ENCOUNTER
Edgar from [facility] auth dept requesting last ov notes to be sent to start auth for stress test. Any questions call Edgar [phone]      F: [phone]

## 2025-06-03 ENCOUNTER — TELEPHONE (OUTPATIENT)
Dept: CARDIOLOGY CLINIC | Age: 45
End: 2025-06-03

## 2025-06-03 NOTE — TELEPHONE ENCOUNTER
Edgar called to request the office to fax him the ov notes w/reason for the echo, any related documents to support the echo needs.  Please advise.  Thank you

## 2025-06-06 ENCOUNTER — TELEPHONE (OUTPATIENT)
Dept: CARDIOLOGY CLINIC | Age: 45
End: 2025-06-06

## 2025-06-06 NOTE — TELEPHONE ENCOUNTER
Christie from [facility] called to ask to fax the EKG tracing to fax:  [phone].  Thank you

## 2025-06-12 ENCOUNTER — HOSPITAL ENCOUNTER (OUTPATIENT)
Age: 45
Discharge: HOME | End: 2025-06-14
Payer: COMMERCIAL

## 2025-06-12 VITALS
DIASTOLIC BLOOD PRESSURE: 91 MMHG | HEIGHT: 62 IN | BODY MASS INDEX: 30 KG/M2 | WEIGHT: 163 LBS | SYSTOLIC BLOOD PRESSURE: 136 MMHG

## 2025-06-12 DIAGNOSIS — R07.9 CHEST PAIN, UNSPECIFIED TYPE: ICD-10-CM

## 2025-06-12 LAB
ECHO AO ROOT DIAM: 2.8 CM
ECHO AO ROOT INDEX: 1.6 CM/M2
ECHO BSA: 1.8 M2
ECHO LV FRACTIONAL SHORTENING: 38 % (ref 28–44)
ECHO LV INTERNAL DIMENSION DIASTOLE INDEX: 2.23 CM/M2
ECHO LV INTERNAL DIMENSION DIASTOLIC: 3.9 CM (ref 3.9–5.3)
ECHO LV INTERNAL DIMENSION SYSTOLIC INDEX: 1.37 CM/M2
ECHO LV INTERNAL DIMENSION SYSTOLIC: 2.4 CM
ECHO LV IVSD: 0.6 CM (ref 0.6–0.9)
ECHO LV MASS 2D: 75.1 G (ref 67–162)
ECHO LV MASS INDEX 2D: 42.9 G/M2 (ref 43–95)
ECHO LV POSTERIOR WALL DIASTOLIC: 0.8 CM (ref 0.6–0.9)
ECHO LV RELATIVE WALL THICKNESS RATIO: 0.41
ECHO LVOT AREA: 2.3 CM2
ECHO LVOT DIAM: 1.7 CM
STRESS BASELINE DIAS BP: 66 MMHG
STRESS BASELINE HR: 98 BPM
STRESS BASELINE SYS BP: 118 MMHG
STRESS ESTIMATED WORKLOAD: 6.4 METS
STRESS EXERCISE DUR MIN: 3 MIN
STRESS EXERCISE DUR SEC: 47 SEC
STRESS PEAK DIAS BP: 72 MMHG
STRESS PEAK SYS BP: 172 MMHG
STRESS PERCENT HR ACHIEVED: 96 %
STRESS POST PEAK HR: 171 BPM
STRESS RATE PRESSURE PRODUCT: ABNORMAL BPM*MMHG
STRESS ST DEPRESSION: 1 MM
STRESS TARGET HR: 178 BPM

## 2025-06-12 PROCEDURE — 93017 CV STRESS TEST TRACING ONLY: CPT

## 2025-06-12 PROCEDURE — 93321 DOPPLER ECHO F-UP/LMTD STD: CPT

## 2025-06-13 ENCOUNTER — RESULTS FOLLOW-UP (OUTPATIENT)
Dept: CARDIOLOGY CLINIC | Age: 45
End: 2025-06-13

## 2025-06-18 ENCOUNTER — OFFICE VISIT (OUTPATIENT)
Dept: CARDIOLOGY CLINIC | Age: 45
End: 2025-06-18
Payer: COMMERCIAL

## 2025-06-18 VITALS
WEIGHT: 164 LBS | HEART RATE: 83 BPM | DIASTOLIC BLOOD PRESSURE: 78 MMHG | BODY MASS INDEX: 30.18 KG/M2 | SYSTOLIC BLOOD PRESSURE: 108 MMHG | HEIGHT: 62 IN | OXYGEN SATURATION: 100 %

## 2025-06-18 DIAGNOSIS — R06.02 SHORTNESS OF BREATH: ICD-10-CM

## 2025-06-18 DIAGNOSIS — R00.2 PALPITATION: ICD-10-CM

## 2025-06-18 DIAGNOSIS — R07.9 CHEST PAIN, UNSPECIFIED TYPE: Primary | ICD-10-CM

## 2025-06-18 DIAGNOSIS — R07.89 OTHER CHEST PAIN: ICD-10-CM

## 2025-06-18 DIAGNOSIS — I49.3 PVC (PREMATURE VENTRICULAR CONTRACTION): ICD-10-CM

## 2025-06-18 DIAGNOSIS — R94.39 ABNORMAL STRESS TEST: ICD-10-CM

## 2025-06-18 DIAGNOSIS — R42 DIZZINESS: ICD-10-CM

## 2025-06-18 PROCEDURE — 93000 ELECTROCARDIOGRAM COMPLETE: CPT | Performed by: INTERNAL MEDICINE

## 2025-06-18 PROCEDURE — G8417 CALC BMI ABV UP PARAM F/U: HCPCS | Performed by: INTERNAL MEDICINE

## 2025-06-18 PROCEDURE — G2211 COMPLEX E/M VISIT ADD ON: HCPCS | Performed by: INTERNAL MEDICINE

## 2025-06-18 PROCEDURE — 99214 OFFICE O/P EST MOD 30 MIN: CPT | Performed by: INTERNAL MEDICINE

## 2025-06-18 PROCEDURE — 1036F TOBACCO NON-USER: CPT | Performed by: INTERNAL MEDICINE

## 2025-06-18 PROCEDURE — G8427 DOCREV CUR MEDS BY ELIG CLIN: HCPCS | Performed by: INTERNAL MEDICINE

## 2025-06-18 RX ORDER — METOPROLOL TARTRATE 25 MG/1
TABLET, FILM COATED ORAL
Qty: 180 TABLET | Refills: 1
Start: 2025-06-18

## 2025-06-18 NOTE — PROGRESS NOTES
[facility] [place]   xxxxxxxxxxxxxxxxElectrophysiology Follow up   Date: 6/12/2025  I had the privilege of visiting Clarisa De La Cruz in the office.     CC: ***   HPI: Clarisa De La Cruz is a 44 y.o. female with a PMH of tachyarrhythmias and palpitations. She reports that growing up as a child, she noticed a fast HR, could feel it beating in back of her head. Never told her parents. Once she was older, would notice her HR would be in the 90's upon having annual check ups. She started seeing a cardiologist 2014 years ago as she wanted to started a family.     She notices her fast heart rate when she is hot and also if she consumes alcohol. She also feels it when she has anxiety.This causes her to feel faint and near syncopal      Her cardiologist diagnosed with her inapporiate sinus tachycardia, and possible POTS     12/2017 - on metoprolol which helped reduce the number of palpitations episodes.     01/30/2020 - attempted to  wean metoprolol and felt more frequent palpitations. C/o decreased libido.  Consider changing Metoprolol to verapamil      08/06/2020 - Patient had concerns about switching to Verapamil because metoprolol had worked well for her.  Plan to transition to verapamil. Patient to call when she is ready to proceed.      05/27/2021 called In with symptoms of chest heaviness for a few weeks      [facility]OT 5/13/2021 revealed all sinus rhythm and symptoms with sinus rhythm      3/18/2024 Patient contacted office with new symptoms of \"fluttering.\"    She has been abstaining from caffeine for the past few days but continues to experience these episodes. Alcohol is not consumed, adequate sleep is maintained, and no significant stress is reported. The episodes are triggered by changes in posture, such as sitting down or standing up, and can occur as frequently as every few minutes      Exercsie stress test came back positive for ischemia on 4/18, she is scheduled for a stress echo on 6/12/2025.    Interval 
diastolic function.    Right Ventricle: Right ventricle size is normal. Normal systolic function.    Image quality is adequate.    Exercise Stress Test 2025    Stress Test: A Bernardo protocol stress test was performed. The patient was stressed for 4 min and 20 sec. The patient reported dyspnea, fatigue and no chest pain during the stress test. Symptoms began during stress and ended during recovery.    ECG: The stress ECG was positive for ischemia.    Resting ECG: The ECG shows sinus rhythm. Resting ECG shows non-specific ST-segment abnormalities.    Stress EC.0 mm of horizontal ST depression was noted. Normalized in early recovery. The stress ECG was positive for ischemia.    Event Monitor 2025  Sinus rhythm with average rate of 80.  2 short PAT's  Rare PVC  Rare PAC  50 symptom episodes are associated with sinus rhythm and sinus tachycardia with a PVC or PAC    Coronary CT calcium score 1/10/2024 ([facility])  IMPRESSION:     1. Calcium score of 0, which implies no identifiable plaque, with a very low, generally less than 5 %, risk of coronary artery disease.      Event Monitor 2021 to 2021 sinus rhythm,High , Low HR 48, Avg HR 74. All symptoms with Sinus Rhythm       Echo 2020   Summary   Normal left ventricle size, wall thickness, and systolic function with a   visually estimated ejection fraction of 55%.   No regional wall motion abnormalities are seen.   Normal left ventricular diastolic filling pressure.   No significant valvular disease.     GXT: 17  Interpretation:    Resting ECG: Normal sinus rhythm.  The exercise was stopped due to shortness of breath.    Symptoms/comments: chest pain, 5/10 before exercise, subsided during exercise. No further chest pain.  The patient was anxious.   INTERPRETATION: NORMAL STRESS TEST     Event monitor:  17  Basic Rhythm:  sinus  Number of Events:  51  Events/Symptoms:  Pt reported episodes of \"chest pain or   pressure, flutter or

## 2025-07-01 ENCOUNTER — TELEPHONE (OUTPATIENT)
Dept: CARDIOLOGY CLINIC | Age: 45
End: 2025-07-01

## 2025-07-01 NOTE — TELEPHONE ENCOUNTER
Called patient and and left message with offer for sooner appt 7/10 at 2:15 with [facility] at [facility]

## 2025-07-02 NOTE — TELEPHONE ENCOUNTER
Called pt to offer appt, she stated she is going with [facility] as they are closer to her. Cancelled appt.